# Patient Record
Sex: FEMALE | Race: WHITE | Employment: OTHER | URBAN - METROPOLITAN AREA
[De-identification: names, ages, dates, MRNs, and addresses within clinical notes are randomized per-mention and may not be internally consistent; named-entity substitution may affect disease eponyms.]

---

## 2017-02-21 PROBLEM — N88.2 CERVICAL STENOSIS (UTERINE CERVIX): Status: ACTIVE | Noted: 2017-02-21

## 2017-08-24 PROBLEM — N87.0 CIN I (CERVICAL INTRAEPITHELIAL NEOPLASIA I): Status: ACTIVE | Noted: 2017-08-24

## 2017-09-29 PROBLEM — M48.061 SPINAL STENOSIS AT L4-L5 LEVEL: Status: ACTIVE | Noted: 2017-09-29

## 2017-10-24 ENCOUNTER — HOSPITAL ENCOUNTER (OUTPATIENT)
Dept: LAB | Age: 67
Discharge: HOME | End: 2017-10-24
Payer: MEDICARE

## 2017-10-24 DIAGNOSIS — E87.5 HYPERKALEMIA: ICD-10-CM

## 2017-10-24 LAB
ANION GAP SERPL CALC-SCNC: 7 MMOL/L (ref 7–16)
BUN SERPL-MCNC: 19 MG/DL (ref 8–23)
CALCIUM SERPL-MCNC: 9.3 MG/DL (ref 8.3–10.4)
CHLORIDE SERPL-SCNC: 98 MMOL/L (ref 98–107)
CO2 SERPL-SCNC: 31 MMOL/L (ref 21–32)
CREAT SERPL-MCNC: 0.89 MG/DL (ref 0.6–1)
GLUCOSE SERPL-MCNC: 102 MG/DL (ref 65–100)
POTASSIUM SERPL-SCNC: 3.9 MMOL/L (ref 3.5–5.1)
SODIUM SERPL-SCNC: 136 MMOL/L (ref 136–145)

## 2017-10-24 PROCEDURE — 80048 BASIC METABOLIC PNL TOTAL CA: CPT | Performed by: FAMILY MEDICINE

## 2017-10-24 PROCEDURE — 36415 COLL VENOUS BLD VENIPUNCTURE: CPT | Performed by: FAMILY MEDICINE

## 2017-11-22 ENCOUNTER — HOSPITAL ENCOUNTER (OUTPATIENT)
Dept: LAB | Age: 67
Discharge: HOME | End: 2017-11-22
Payer: MEDICARE

## 2017-11-22 DIAGNOSIS — Z51.81 MEDICATION MONITORING ENCOUNTER: ICD-10-CM

## 2017-11-22 DIAGNOSIS — E87.5 HYPERKALEMIA: ICD-10-CM

## 2017-11-22 LAB
ANION GAP SERPL CALC-SCNC: 8 MMOL/L (ref 7–16)
BUN SERPL-MCNC: 16 MG/DL (ref 8–23)
CALCIUM SERPL-MCNC: 9 MG/DL (ref 8.3–10.4)
CHLORIDE SERPL-SCNC: 103 MMOL/L (ref 98–107)
CO2 SERPL-SCNC: 29 MMOL/L (ref 21–32)
CREAT SERPL-MCNC: 0.74 MG/DL (ref 0.6–1)
GLUCOSE SERPL-MCNC: 89 MG/DL (ref 65–100)
POTASSIUM SERPL-SCNC: 3.9 MMOL/L (ref 3.5–5.1)
SODIUM SERPL-SCNC: 140 MMOL/L (ref 136–145)

## 2017-11-22 PROCEDURE — 36415 COLL VENOUS BLD VENIPUNCTURE: CPT | Performed by: FAMILY MEDICINE

## 2017-11-22 PROCEDURE — 80048 BASIC METABOLIC PNL TOTAL CA: CPT | Performed by: FAMILY MEDICINE

## 2018-03-26 PROBLEM — N87.1 CIN II (CERVICAL INTRAEPITHELIAL NEOPLASIA II): Status: ACTIVE | Noted: 2018-03-26

## 2018-06-06 PROBLEM — E66.09 CLASS 1 OBESITY DUE TO EXCESS CALORIES WITH BODY MASS INDEX (BMI) OF 33.0 TO 33.9 IN ADULT: Status: ACTIVE | Noted: 2018-06-06

## 2018-07-25 ENCOUNTER — HOSPITAL ENCOUNTER (OUTPATIENT)
Dept: MAMMOGRAPHY | Age: 68
Discharge: HOME | End: 2018-07-25
Attending: FAMILY MEDICINE
Payer: MEDICARE

## 2018-07-25 DIAGNOSIS — Z12.31 VISIT FOR SCREENING MAMMOGRAM: ICD-10-CM

## 2018-07-25 PROCEDURE — 77067 SCR MAMMO BI INCL CAD: CPT

## 2018-11-05 PROBLEM — Z23 NEED FOR TDAP VACCINATION: Status: ACTIVE | Noted: 2018-11-05

## 2018-11-05 PROBLEM — R73.02 IGT (IMPAIRED GLUCOSE TOLERANCE): Status: ACTIVE | Noted: 2018-11-05

## 2018-11-05 PROBLEM — Z23 NEED FOR SHINGLES VACCINE: Status: ACTIVE | Noted: 2018-11-05

## 2018-11-05 PROBLEM — E66.9 OBESITY (BMI 30.0-34.9): Status: ACTIVE | Noted: 2018-06-06

## 2018-11-05 PROBLEM — F33.0 MILD EPISODE OF RECURRENT MAJOR DEPRESSIVE DISORDER (HCC): Status: ACTIVE | Noted: 2018-11-05

## 2018-12-15 PROBLEM — E87.5 HYPERKALEMIA: Status: ACTIVE | Noted: 2018-12-15

## 2019-03-05 PROBLEM — F33.1 MODERATE EPISODE OF RECURRENT MAJOR DEPRESSIVE DISORDER (HCC): Status: ACTIVE | Noted: 2018-11-05

## 2019-07-03 PROBLEM — E66.3 OVERWEIGHT (BMI 25.0-29.9): Status: ACTIVE | Noted: 2018-06-06

## 2019-08-28 ENCOUNTER — HOSPITAL ENCOUNTER (OUTPATIENT)
Dept: MAMMOGRAPHY | Age: 69
Discharge: HOME | End: 2019-08-28
Attending: OBSTETRICS & GYNECOLOGY
Payer: MEDICARE

## 2019-08-28 DIAGNOSIS — Z12.39 BREAST CANCER SCREENING: ICD-10-CM

## 2019-08-28 PROCEDURE — 77067 SCR MAMMO BI INCL CAD: CPT

## 2019-09-23 PROBLEM — Z23 NEED FOR TDAP VACCINATION: Status: RESOLVED | Noted: 2018-11-05 | Resolved: 2019-09-23

## 2019-09-23 PROBLEM — Z23 NEED FOR SHINGLES VACCINE: Status: RESOLVED | Noted: 2018-11-05 | Resolved: 2019-09-23

## 2020-01-18 PROBLEM — G56.03 BILATERAL CARPAL TUNNEL SYNDROME: Status: ACTIVE | Noted: 2020-01-18

## 2020-03-01 PROBLEM — M54.17 LUMBOSACRAL RADICULOPATHY: Status: ACTIVE | Noted: 2020-03-01

## 2020-07-05 ENCOUNTER — HOSPITAL ENCOUNTER (EMERGENCY)
Age: 70
Discharge: HOME | End: 2020-07-05
Attending: EMERGENCY MEDICINE
Payer: MEDICARE

## 2020-07-05 VITALS
DIASTOLIC BLOOD PRESSURE: 80 MMHG | WEIGHT: 150 LBS | HEART RATE: 75 BPM | TEMPERATURE: 98.5 F | OXYGEN SATURATION: 94 % | HEIGHT: 61 IN | SYSTOLIC BLOOD PRESSURE: 124 MMHG | RESPIRATION RATE: 16 BRPM | BODY MASS INDEX: 28.32 KG/M2

## 2020-07-05 DIAGNOSIS — B34.9 VIRAL SYNDROME: Primary | ICD-10-CM

## 2020-07-05 PROCEDURE — 99282 EMERGENCY DEPT VISIT SF MDM: CPT

## 2020-07-05 PROCEDURE — 87635 SARS-COV-2 COVID-19 AMP PRB: CPT

## 2020-07-05 NOTE — ED PROVIDER NOTES
Patient has a history of hypertension and hypercholesterolemia, states that for the past several days she has had a headache and a mild cough. She also has had body aches and a slight sore throat. She denies any shortness of breath or chest pain. She denies any known sick contacts. She has not taken any medicine for her symptoms. She was concerned about possible COVID-19 infection and came here for evaluation.            Past Medical History:   Diagnosis Date    Abnormal Papanicolaou smear of cervix     Arthritis 3/24/2014    Bulging lumbar disc     Depressive disorder 3/24/2014    GERD (gastroesophageal reflux disease) 3/24/2014    Heartburn     HTN (hypertension) 2013    Hypercholesterolemia 3/24/2014    Spinal stenosis     Spinal stenosis at L4-L5 level 2017    Followed by JANUSZ Sellers    Vitamin D deficiency 3/24/2014       Past Surgical History:   Procedure Laterality Date    HX CHOLECYSTECTOMY      HX COLPOSCOPY  2013    times two    HX TONSILLECTOMY      LAP,TUBAL CAUTERY           Family History:   Problem Relation Age of Onset    Heart Attack Father             Heart Disease Father     Cancer Mother         cervical    Cancer Sister         breast    Stroke Sister     Breast Cancer Sister 76    Heart Disease Brother     Diabetes Maternal Grandmother        Social History     Socioeconomic History    Marital status:      Spouse name: Not on file    Number of children: Not on file    Years of education: Not on file    Highest education level: Not on file   Occupational History    Not on file   Social Needs    Financial resource strain: Not on file    Food insecurity     Worry: Not on file     Inability: Not on file    Transportation needs     Medical: Not on file     Non-medical: Not on file   Tobacco Use    Smoking status: Former Smoker     Packs/day: 1.50     Years: 45.00     Pack years: 67.50     Last attempt to quit: 10/1/2012     Years since quittin.7    Smokeless tobacco: Never Used   Substance and Sexual Activity    Alcohol use: No     Alcohol/week: 0.0 standard drinks     Frequency: Never    Drug use: No    Sexual activity: Yes     Partners: Male   Lifestyle    Physical activity     Days per week: Not on file     Minutes per session: Not on file    Stress: Not on file   Relationships    Social connections     Talks on phone: Not on file     Gets together: Not on file     Attends Jewish service: Not on file     Active member of club or organization: Not on file     Attends meetings of clubs or organizations: Not on file     Relationship status: Not on file    Intimate partner violence     Fear of current or ex partner: Not on file     Emotionally abused: Not on file     Physically abused: Not on file     Forced sexual activity: Not on file   Other Topics Concern     Service Not Asked    Blood Transfusions Not Asked    Caffeine Concern Not Asked    Occupational Exposure Not Asked   [employer] Hazards Not Asked    Sleep Concern Not Asked    Stress Concern Not Asked    Weight Concern Not Asked    Special Diet Not Asked    Back Care Not Asked    Exercise Not Asked    Bike Helmet Not Asked   [address] Not Asked    Self-Exams Not Asked   Social History Narrative    Not on file         ALLERGIES: Sulfa (sulfonamide antibiotics)    Review of Systems   Constitutional: Negative for chills and fever. Gastrointestinal: Negative for nausea and vomiting. All other systems reviewed and are negative. Vitals:    20 1153   BP: 124/80   Pulse: 75   Resp: 16   Temp: 98.5 °F (36.9 °C)   SpO2: 94%   Weight: 68 kg (150 lb)   Height: 5' 1\" (1.549 m)            Physical Exam  Vitals signs and nursing note reviewed. Constitutional:       Appearance: Normal appearance. She is well-developed. HENT:      Head: Normocephalic and atraumatic.    Eyes:      Conjunctiva/sclera: Conjunctivae normal.      Pupils: Pupils are equal, round, and reactive to light. Neck:      Musculoskeletal: Normal range of motion and neck supple. Cardiovascular:      Rate and Rhythm: Normal rate and regular rhythm. Pulmonary:      Effort: Pulmonary effort is normal. No respiratory distress. Breath sounds: Normal breath sounds. No wheezing. Musculoskeletal:         General: No tenderness. Skin:     General: Skin is warm and dry. Neurological:      Mental Status: She is alert and oriented to person, place, and time.    Psychiatric:         Behavior: Behavior normal.          MDM  Number of Diagnoses or Management Options  Viral syndrome: new and does not require workup     Amount and/or Complexity of Data Reviewed  Clinical lab tests: ordered    Risk of Complications, Morbidity, and/or Mortality  Presenting problems: moderate  Diagnostic procedures: moderate  Management options: low    Patient Progress  Patient progress: stable         Procedures

## 2020-07-05 NOTE — ED TRIAGE NOTES
Patient arrives ambulatory to triage with mask in place. Patient reports onset of headaches on Friday, cough, generalized weakness. Patient reports body aches, especially in her legs. Patient denies fevers at home. Reports dry cough. Denies known exposure to covid. Patient requests she is here for covid test only.

## 2020-07-05 NOTE — ED NOTES
I have reviewed discharge instructions with the patient. The patient verbalized understanding. Patient left ED via Discharge Method: ambulatory to Home with family    Opportunity for questions and clarification provided. Patient given 0 scripts. To continue your aftercare when you leave the hospital, you may receive an automated call from our care team to check in on how you are doing. This is a free service and part of our promise to provide the best care and service to meet your aftercare needs.  If you have questions, or wish to unsubscribe from this service please call [phone]. Thank you for Choosing our [facility].

## 2020-07-05 NOTE — DISCHARGE INSTRUCTIONS
Follow-up with your doctor, return to the emergency department if your symptoms worsen. Advance Care Planning  People with COVID-19 may have no symptoms, mild symptoms, such as fever, cough, and shortness of breath or they may have more severe illness, developing severe and fatal pneumonia. As a result, Advance Care Planning with attention to naming a health care decision maker (someone you trust to make healthcare decisions for you if you could not speak for yourself) and sharing other health care preferences is important BEFORE a possible health crisis. Please contact your Primary Care Provider to discuss Advance Care Planning. Preventing the Spread of Coronavirus Disease 2019 in Homes and Residential Communities  For the most recent information go to [employer].    Prevention steps for People with confirmed or suspected COVID-19 (including persons under investigation) who do not need to be hospitalized  and   People with confirmed COVID-19 who were hospitalized and determined to be medically stable to go home    Your healthcare provider and public health staff will evaluate whether you can be cared for at home. If it is determined that you do not need to be hospitalized and can be isolated at home, you will be monitored by staff from your local or state health department. You should follow the prevention steps below until a healthcare provider or local or state health department says you can return to your normal activities. Stay home except to get medical care  People who are mildly ill with COVID-19 are able to isolate at home during their illness. You should restrict activities outside your home, except for getting medical care. Do not go to work, school, or public areas. Avoid using public transportation, ride-sharing, or taxis.   Separate yourself from other people and animals in your home  People: As much as possible, you should stay in a specific room and away from other people in your home. Also, you should use a separate bathroom, if available. Animals: You should restrict contact with pets and other animals while you are sick with COVID-19, just like you would around other people. Although there have not been reports of pets or other animals becoming sick with COVID-19, it is still recommended that people sick with COVID-19 limit contact with animals until more information is known about the virus. When possible, have another member of your household care for your animals while you are sick. If you are sick with COVID-19, avoid contact with your pet, including petting, snuggling, being kissed or licked, and sharing food. If you must care for your pet or be around animals while you are sick, wash your hands before and after you interact with pets and wear a facemask. Call ahead before visiting your doctor  If you have a medical appointment, call the healthcare provider and tell them that you have or may have COVID-19. This will help the healthcare providers office take steps to keep other people from getting infected or exposed. Wear a facemask  You should wear a facemask when you are around other people (e.g., sharing a room or vehicle) or pets and before you enter a healthcare providers office. If you are not able to wear a facemask (for example, because it causes trouble breathing), then people who live with you should not stay in the same room with you, or they should wear a facemask if they enter your room. Cover your coughs and sneezes  Cover your mouth and nose with a tissue when you cough or sneeze. Throw used tissues in a lined trash can. Immediately wash your hands with soap and water for at least 20 seconds or, if soap and water are not available, clean your hands with an alcohol-based hand  that contains at least 60% alcohol.   Clean your hands often  Wash your hands often with soap and water for at least 20 seconds, especially after blowing your nose, coughing, or sneezing; going to the bathroom; and before eating or preparing food. If soap and water are not readily available, use an alcohol-based hand  with at least 60% alcohol, covering all surfaces of your hands and rubbing them together until they feel dry. Soap and water are the best option if hands are visibly dirty. Avoid touching your eyes, nose, and mouth with unwashed hands. Avoid sharing personal household items  You should not share dishes, drinking glasses, cups, eating utensils, towels, or bedding with other people or pets in your home. After using these items, they should be washed thoroughly with soap and water. Clean all high-touch surfaces everyday  High touch surfaces include counters, tabletops, doorknobs, bathroom fixtures, toilets, phones, keyboards, tablets, and bedside tables. Also, clean any surfaces that may have blood, stool, or body fluids on them. Use a household cleaning spray or wipe, according to the label instructions. Labels contain instructions for safe and effective use of the cleaning product including precautions you should take when applying the product, such as wearing gloves and making sure you have good ventilation during use of the product. Monitor your symptoms  Seek prompt medical attention if your illness is worsening (e.g., difficulty breathing). Before seeking care, call your healthcare provider and tell them that you have, or are being evaluated for, COVID-19. Put on a facemask before you enter the facility. These steps will help the healthcare providers office to keep other people in the office or waiting room from getting infected or exposed. Ask your healthcare provider to call the local or [facility] health department.  Persons who are placed under active monitoring or facilitated self-monitoring should follow instructions provided by their local health department or occupational health professionals, as appropriate. When working with your local health department check their available hours. If you have a medical emergency and need to call 911, notify the dispatch personnel that you have, or are being evaluated for COVID-19. If possible, put on a facemask before emergency medical services arrive. Discontinuing home isolation  Patients with confirmed COVID-19 should remain under home isolation precautions until the risk of secondary transmission to others is thought to be low. The decision to discontinue home isolation precautions should be made on a case-by-case basis, in consultation with healthcare providers and state and local health departments.

## 2020-07-09 LAB — EMERGENT DISEASE PANEL, EDPR: NOT DETECTED

## 2020-07-20 ENCOUNTER — PATIENT OUTREACH (OUTPATIENT)
Dept: CASE MANAGEMENT | Age: 70
End: 2020-07-20

## 2020-07-20 NOTE — PROGRESS NOTES
Date/Time:  7/20/2020 11:22 AM  Attempted to reach patient by telephone for follow up from ED visit. Unable to leave HIPPA compliant message. COVID test- not detected noted. Has contact with PCP office if needs arise.

## 2020-10-28 ENCOUNTER — HOSPITAL ENCOUNTER (OUTPATIENT)
Dept: MAMMOGRAPHY | Age: 70
Discharge: HOME | End: 2020-10-28
Attending: FAMILY MEDICINE
Payer: MEDICARE

## 2020-10-28 DIAGNOSIS — Z12.31 VISIT FOR SCREENING MAMMOGRAM: ICD-10-CM

## 2020-10-28 PROCEDURE — 77067 SCR MAMMO BI INCL CAD: CPT

## 2021-06-17 PROBLEM — L65.9 ALOPECIA: Status: ACTIVE | Noted: 2021-06-17

## 2021-12-04 PROBLEM — Z28.21 COVID-19 VACCINATION REFUSED: Status: ACTIVE | Noted: 2021-12-04

## 2021-12-04 PROBLEM — Z87.891 STOPPED SMOKING WITH GREATER THAN 30 PACK YEAR HISTORY: Status: ACTIVE | Noted: 2021-12-04

## 2021-12-06 PROBLEM — Z79.899 HIGH RISK MEDICATION USE: Status: ACTIVE | Noted: 2021-12-06

## 2021-12-06 PROBLEM — F41.1 GENERALIZED ANXIETY DISORDER WITH PANIC ATTACKS: Status: ACTIVE | Noted: 2021-12-06

## 2021-12-06 PROBLEM — F41.0 GENERALIZED ANXIETY DISORDER WITH PANIC ATTACKS: Status: ACTIVE | Noted: 2021-12-06

## 2021-12-06 PROBLEM — Z53.20 MAMMOGRAM DECLINED: Status: ACTIVE | Noted: 2021-12-06

## 2022-03-18 PROBLEM — Z28.21 COVID-19 VACCINATION REFUSED: Status: ACTIVE | Noted: 2021-12-04

## 2022-03-18 PROBLEM — N87.0 CIN I (CERVICAL INTRAEPITHELIAL NEOPLASIA I): Status: ACTIVE | Noted: 2017-08-24

## 2022-03-18 PROBLEM — M54.17 LUMBOSACRAL RADICULOPATHY: Status: ACTIVE | Noted: 2020-03-01

## 2022-03-18 PROBLEM — Z87.891 STOPPED SMOKING WITH GREATER THAN 30 PACK YEAR HISTORY: Status: ACTIVE | Noted: 2021-12-04

## 2022-03-19 PROBLEM — R73.02 IGT (IMPAIRED GLUCOSE TOLERANCE): Status: ACTIVE | Noted: 2018-11-05

## 2022-03-19 PROBLEM — G56.03 BILATERAL CARPAL TUNNEL SYNDROME: Status: ACTIVE | Noted: 2020-01-18

## 2022-03-19 PROBLEM — M48.061 SPINAL STENOSIS AT L4-L5 LEVEL: Status: ACTIVE | Noted: 2017-09-29

## 2022-03-19 PROBLEM — F41.1 GENERALIZED ANXIETY DISORDER WITH PANIC ATTACKS: Status: ACTIVE | Noted: 2021-12-06

## 2022-03-19 PROBLEM — Z53.20 MAMMOGRAM DECLINED: Status: ACTIVE | Noted: 2021-12-06

## 2022-03-19 PROBLEM — F41.0 GENERALIZED ANXIETY DISORDER WITH PANIC ATTACKS: Status: ACTIVE | Noted: 2021-12-06

## 2022-03-20 PROBLEM — F33.0 MILD EPISODE OF RECURRENT MAJOR DEPRESSIVE DISORDER (HCC): Status: ACTIVE | Noted: 2018-11-05

## 2022-03-20 PROBLEM — E87.5 HYPERKALEMIA: Status: ACTIVE | Noted: 2018-12-15

## 2022-03-20 PROBLEM — L65.9 ALOPECIA: Status: ACTIVE | Noted: 2021-06-17

## 2022-03-20 PROBLEM — E66.3 OVERWEIGHT (BMI 25.0-29.9): Status: ACTIVE | Noted: 2018-06-06

## 2022-03-20 PROBLEM — Z79.899 HIGH RISK MEDICATION USE: Status: ACTIVE | Noted: 2021-12-06

## 2022-03-20 PROBLEM — N88.2 CERVICAL STENOSIS (UTERINE CERVIX): Status: ACTIVE | Noted: 2017-02-21

## 2022-05-04 DIAGNOSIS — I10 BENIGN ESSENTIAL HTN: Primary | ICD-10-CM

## 2022-05-04 DIAGNOSIS — R73.02 IGT (IMPAIRED GLUCOSE TOLERANCE): ICD-10-CM

## 2022-05-04 DIAGNOSIS — E78.00 HYPERCHOLESTEROLEMIA: ICD-10-CM

## 2022-06-17 RX ORDER — FUROSEMIDE 40 MG/1
TABLET ORAL
Qty: 60 TABLET | OUTPATIENT
Start: 2022-06-17

## 2022-06-17 RX ORDER — LANSOPRAZOLE 30 MG/1
CAPSULE, DELAYED RELEASE ORAL
Qty: 60 CAPSULE | OUTPATIENT
Start: 2022-06-17

## 2022-07-01 ENCOUNTER — PATIENT MESSAGE (OUTPATIENT)
Dept: FAMILY MEDICINE CLINIC | Facility: CLINIC | Age: 72
End: 2022-07-01

## 2022-07-05 RX ORDER — LANSOPRAZOLE 30 MG/1
30 CAPSULE, DELAYED RELEASE ORAL DAILY
Qty: 90 CAPSULE | Refills: 3 | Status: SHIPPED | OUTPATIENT
Start: 2022-07-05

## 2022-07-05 RX ORDER — FUROSEMIDE 40 MG/1
40 TABLET ORAL DAILY
Qty: 90 TABLET | Refills: 3 | Status: SHIPPED | OUTPATIENT
Start: 2022-07-05

## 2022-08-10 RX ORDER — DULOXETIN HYDROCHLORIDE 30 MG/1
CAPSULE, DELAYED RELEASE ORAL
Qty: 90 CAPSULE | OUTPATIENT
Start: 2022-08-10

## 2022-08-17 DIAGNOSIS — F33.1 MAJOR DEPRESSIVE DISORDER, RECURRENT, MODERATE (HCC): ICD-10-CM

## 2022-08-17 DIAGNOSIS — E78.00 PURE HYPERCHOLESTEROLEMIA, UNSPECIFIED: Primary | ICD-10-CM

## 2022-08-17 NOTE — TELEPHONE ENCOUNTER
Patient called to advise she was needing refills on atorvastatin (LIPITOR) 20 MG tablet, DULoxetine (CYMBALTA) 30 MG extended release capsule and DULoxetine (CYMBALTA) 60 MG extended release capsule to [facility].

## 2022-08-19 RX ORDER — DULOXETIN HYDROCHLORIDE 60 MG/1
60 CAPSULE, DELAYED RELEASE ORAL DAILY
Qty: 90 CAPSULE | Refills: 1 | Status: SHIPPED | OUTPATIENT
Start: 2022-08-19 | End: 2022-09-18

## 2022-08-19 RX ORDER — DULOXETIN HYDROCHLORIDE 30 MG/1
30 CAPSULE, DELAYED RELEASE ORAL DAILY
Qty: 90 CAPSULE | Refills: 1 | Status: SHIPPED | OUTPATIENT
Start: 2022-08-19

## 2022-08-19 RX ORDER — ATORVASTATIN CALCIUM 20 MG/1
20 TABLET, FILM COATED ORAL DAILY
Qty: 90 TABLET | Refills: 1 | Status: SHIPPED | OUTPATIENT
Start: 2022-08-19

## 2022-09-08 ENCOUNTER — TELEPHONE (OUTPATIENT)
Dept: ORTHOPEDIC SURGERY | Age: 72
End: 2022-09-08

## 2022-09-08 DIAGNOSIS — M54.17 RADICULOPATHY, LUMBOSACRAL REGION: ICD-10-CM

## 2022-09-08 DIAGNOSIS — M48.061 SPINAL STENOSIS, LUMBAR REGION WITHOUT NEUROGENIC CLAUDICATION: ICD-10-CM

## 2022-09-08 DIAGNOSIS — M54.16 RADICULOPATHY, LUMBAR REGION: Primary | ICD-10-CM

## 2022-09-08 DIAGNOSIS — M47.816 SPONDYLOSIS WITHOUT MYELOPATHY OR RADICULOPATHY, LUMBAR REGION: ICD-10-CM

## 2022-09-08 RX ORDER — TRAMADOL HYDROCHLORIDE 50 MG/1
50 TABLET ORAL 2 TIMES DAILY PRN
Qty: 90 TABLET | Refills: 5 | Status: SHIPPED | OUTPATIENT
Start: 2022-09-08 | End: 2022-10-08

## 2022-09-08 NOTE — TELEPHONE ENCOUNTER
LVM for patient to call back to give me a pharmacy to send the script to and to  make an appointment. Also sent message VIA my chart.

## 2022-09-12 ENCOUNTER — OFFICE VISIT (OUTPATIENT)
Dept: ORTHOPEDIC SURGERY | Age: 72
End: 2022-09-12
Payer: MEDICARE

## 2022-09-12 DIAGNOSIS — M17.12 OSTEOARTHRITIS OF LEFT KNEE, UNSPECIFIED OSTEOARTHRITIS TYPE: ICD-10-CM

## 2022-09-12 DIAGNOSIS — M25.562 ACUTE PAIN OF LEFT KNEE: Primary | ICD-10-CM

## 2022-09-12 PROCEDURE — G8400 PT W/DXA NO RESULTS DOC: HCPCS | Performed by: ORTHOPAEDIC SURGERY

## 2022-09-12 PROCEDURE — 99204 OFFICE O/P NEW MOD 45 MIN: CPT | Performed by: ORTHOPAEDIC SURGERY

## 2022-09-12 PROCEDURE — G8427 DOCREV CUR MEDS BY ELIG CLIN: HCPCS | Performed by: ORTHOPAEDIC SURGERY

## 2022-09-12 PROCEDURE — 3017F COLORECTAL CA SCREEN DOC REV: CPT | Performed by: ORTHOPAEDIC SURGERY

## 2022-09-12 PROCEDURE — G8417 CALC BMI ABV UP PARAM F/U: HCPCS | Performed by: ORTHOPAEDIC SURGERY

## 2022-09-12 PROCEDURE — 4004F PT TOBACCO SCREEN RCVD TLK: CPT | Performed by: ORTHOPAEDIC SURGERY

## 2022-09-12 PROCEDURE — 20611 DRAIN/INJ JOINT/BURSA W/US: CPT | Performed by: ORTHOPAEDIC SURGERY

## 2022-09-12 PROCEDURE — 1123F ACP DISCUSS/DSCN MKR DOCD: CPT | Performed by: ORTHOPAEDIC SURGERY

## 2022-09-12 PROCEDURE — 1090F PRES/ABSN URINE INCON ASSESS: CPT | Performed by: ORTHOPAEDIC SURGERY

## 2022-09-12 RX ORDER — TRIAMCINOLONE ACETONIDE 40 MG/ML
40 INJECTION, SUSPENSION INTRA-ARTICULAR; INTRAMUSCULAR ONCE
Status: COMPLETED | OUTPATIENT
Start: 2022-09-12 | End: 2022-09-12

## 2022-09-12 RX ADMIN — TRIAMCINOLONE ACETONIDE 40 MG: 40 INJECTION, SUSPENSION INTRA-ARTICULAR; INTRAMUSCULAR at 09:51

## 2022-09-12 NOTE — PROGRESS NOTES
Name: Huseyin Lugo  YOB: 1950  Gender: female  MRN: [id number]    CC:   Chief Complaint   Patient presents with    Knee Pain     Left knee pain          HPI:   The pain has been present intermittently for years but recently aggravated 2 weeks ago when the patient mis-stepped in her yard 2 weeks ago. It does not hurt at night when sleeping. The pain is located over the knee. It does hurt to walk and gets worse with increased distances. The pain does not radiate down the leg. Numbness and tingling are not noted. Treatment so far has been NSAIDs. H/o lumbar spondylosis managed by Dr. Shelly Murcia. Current Outpatient Medications:     traMADol (ULTRAM) 50 MG tablet, Take 1 tablet by mouth 2 times daily as needed for Pain for up to 30 days. Intended supply: 3 days. Take lowest dose possible to manage pain, Disp: 90 tablet, Rfl: 5    atorvastatin (LIPITOR) 20 MG tablet, Take 1 tablet by mouth daily, Disp: 90 tablet, Rfl: 1    DULoxetine (CYMBALTA) 30 MG extended release capsule, Take 1 capsule by mouth daily, Disp: 90 capsule, Rfl: 1    DULoxetine (CYMBALTA) 60 MG extended release capsule, Take 1 capsule by mouth daily, Disp: 90 capsule, Rfl: 1    furosemide (LASIX) 40 MG tablet, Take 1 tablet by mouth daily TAKE 1 TABLET EVERY DAY, Disp: 90 tablet, Rfl: 3    lansoprazole (PREVACID) 30 MG delayed release capsule, Take 1 capsule by mouth daily TAKE 1 CAPSULE DAILY (BEFORE BREAKFAST). , Disp: 90 capsule, Rfl: 3    Cholecalciferol 50 MCG (2000 UT) CAPS, Take 5,000 Units by mouth, Disp: , Rfl:     LORazepam (ATIVAN) 0.5 MG tablet, Take 0.5 mg by mouth every 6 hours as needed. , Disp: , Rfl:   Allergies   Allergen Reactions    Sulfa Antibiotics Nausea And Vomiting and Rash     Past Medical History:   Diagnosis Date    Abnormal Papanicolaou smear of cervix     Arthritis 3/24/2014    Bulging lumbar disc     Depressive disorder 3/24/2014    GERD (gastroesophageal reflux disease) 3/24/2014 Heartburn     HTN (hypertension) 2013    Hypercholesterolemia 3/24/2014    Menopause     AGE 43    Spinal stenosis     Spinal stenosis at L4-L5 level 2017    Followed by PEDRO Greer    Vitamin D deficiency 3/24/2014     . [facility]  Past Surgical History:   Procedure Laterality Date    CHOLECYSTECTOMY      COLPOSCOPY  2013    times two    LAP,TUBAL CAUTERY      TONSILLECTOMY       Family History   Problem Relation Age of Onset    Cancer Sister         breast    Heart Disease Father     Heart Attack Father             Diabetes Maternal Grandmother     Heart Disease Brother     Stroke Sister     Breast Cancer Sister 76    Cancer Mother         cervical     Social History     Socioeconomic History    Marital status:      Spouse name: Not on file    Number of children: Not on file    Years of education: Not on file    Highest education level: Not on file   Occupational History    Not on file   Tobacco Use    Smoking status: Former     Packs/day: 1.50     Types: Cigarettes     Quit date: 10/1/2012     Years since quittin.9    Smokeless tobacco: Never   Substance and Sexual Activity    Alcohol use: No     Alcohol/week: 0.0 standard drinks    Drug use: No    Sexual activity: Not on file   Other Topics Concern    Not on file   Social History Narrative    Not on file     Social Determinants of Health     Financial Resource Strain: Not on file   Food Insecurity: Not on file   Transportation Needs: Not on file   Physical Activity: Not on file   Stress: Not on file   Social Connections: Not on file   Intimate Partner Violence: Not on file   Housing Stability: Not on file       Review of Systems:  As per HPI. Pertinent positives and negatives are addressed with the patient, particularly those related to musculoskeletal concerns. Non-orthopaedic concerns were referred back to the primary care physician.     PHYSICAL EXAMINATION:   The patient appears their stated age and they are in no distress. The lower extremities are as described below. Circulation is normal with palpable pedal pulses bilaterally and no edema. There is no lymph adenopathy in the popliteal or malleolar region. The skin is without stasis disease distally bilaterally. Hip ROM is smooth and painless. Knee range of motion is 0-120 degrees on the right and 0-110 degrees on the left. left knee: There is 2mm of anterior/posterior translation and 2mm of medial/lateral instability bilaterally. There is 2 degrees of varus alignment in the left knee. There is some pain to palpation over the medial joint line. Limb lengths are equal.  The gait is noted to be with a slight trendelenburg and antalgia. Straight leg test is negative. Quadriceps strength is good. Sensation is intact to light touch bilaterally. Their judgment and insight are normal.  They are oriented to time, place and person. Their memory is good and the mood and affect appropriate. X-RAY: Views of the left knee are reviewed. 4 views standing reveal joint space loss, eburnated bone, and osteophyte formation present. X-ray impression:  Advanced degenerative joint disease of left knee    IMPRESSION:    Diagnosis Orders   1. Acute pain of left knee  XR KNEE LEFT (MIN 4 VIEWS)      2. Osteoarthritis of left knee, unspecified osteoarthritis type        . RECOMMENDATIONS:    Reviewed x-ray findings with the patient. The concerns with functional limitations are increasing and response is variable with conservative measures. Surgery was discussed today with the patient. They are not limited to warrant any type of surgical consideration. We will additionally try injection therapies as we process management of this progressive and chronic condition. Procedure Note: The left knee was prepped with alcohol and injected with 40 mg of Kenalog and 2 mL of 0.5 % marcaine.   [employer] US unit with a variable frequency (6.0-15.0 MHz) linear transducer was used to visualize the retropatellar fat pad, patella, patellar tendon, tibia, and to ensure proper intra-articular placement of medication. Injection image was stored in the patient's permanent chart. The injection was without event and I will follow them as scheduled. Discussed starting HP at follow-up if cortisone does not provide adequate pain relief. Approximately 45 minutes was spent reviewing the medical record, imaging, performing history and physical examination, discussing the diagnosis and treatment plan with the patient, and completing documentation for this visit. We will arrange for HP injections in 4 weeks.

## 2022-10-10 ENCOUNTER — OFFICE VISIT (OUTPATIENT)
Dept: ORTHOPEDIC SURGERY | Age: 72
End: 2022-10-10
Payer: MEDICARE

## 2022-10-10 DIAGNOSIS — M17.12 OSTEOARTHRITIS OF LEFT KNEE, UNSPECIFIED OSTEOARTHRITIS TYPE: Primary | ICD-10-CM

## 2022-10-10 PROCEDURE — 99999 PR OFFICE/OUTPT VISIT,PROCEDURE ONLY: CPT | Performed by: ORTHOPAEDIC SURGERY

## 2022-10-17 ENCOUNTER — OFFICE VISIT (OUTPATIENT)
Dept: ORTHOPEDIC SURGERY | Age: 72
End: 2022-10-17
Payer: MEDICARE

## 2022-10-17 DIAGNOSIS — M17.12 LOCALIZED OSTEOARTHRITIS OF LEFT KNEE: Primary | ICD-10-CM

## 2022-10-17 PROCEDURE — 99999 PR OFFICE/OUTPT VISIT,PROCEDURE ONLY: CPT | Performed by: ORTHOPAEDIC SURGERY

## 2022-10-17 NOTE — PROGRESS NOTES
Name: Gaye Valdovinos  YOB: 1950  Gender: female  MRN: [id number]      Current Outpatient Medications:     atorvastatin (LIPITOR) 20 MG tablet, Take 1 tablet by mouth daily, Disp: 90 tablet, Rfl: 1    DULoxetine (CYMBALTA) 30 MG extended release capsule, Take 1 capsule by mouth daily, Disp: 90 capsule, Rfl: 1    DULoxetine (CYMBALTA) 60 MG extended release capsule, Take 1 capsule by mouth daily, Disp: 90 capsule, Rfl: 1    furosemide (LASIX) 40 MG tablet, Take 1 tablet by mouth daily TAKE 1 TABLET EVERY DAY, Disp: 90 tablet, Rfl: 3    lansoprazole (PREVACID) 30 MG delayed release capsule, Take 1 capsule by mouth daily TAKE 1 CAPSULE DAILY (BEFORE BREAKFAST). , Disp: 90 capsule, Rfl: 3    Cholecalciferol 50 MCG (2000 UT) CAPS, Take 5,000 Units by mouth, Disp: , Rfl:     LORazepam (ATIVAN) 0.5 MG tablet, Take 0.5 mg by mouth every 6 hours as needed. , Disp: , Rfl:   Allergies   Allergen Reactions    Sulfa Antibiotics Nausea And Vomiting and Rash       CC: Left Knee Pain   Encounter Diagnosis   Name Primary? Localized osteoarthritis of left knee Yes        PROCEDURE: 2 of 3 HP      GE US unit with variable frequency (6.0-15.0 MHz) linear transducer was used to visualize the retropatellar fat pad, patella tendon, patella, tibia, and to ensure proper intra-articular needle placement. Injection image was saved to patient's permanent chart. Procedure Note: The patient was placed in upright position with knee hanging freely from exam table. The left knee was prepped in sterile fashion using alcohol wipe. Using [employer] ultrasound guidance, a 22 gauge needle was then introduced into the knee joint from an infrapatellar approach and 2 mL of Orthovisc was injected freely. The needle was then removed, pressure hemostatis achieved, injection site was cleansed with alcohol wipe and dressed with band aid. The patient tolerated the procedure without complication.   The patient  will follow up as scheduled.

## 2022-10-21 ENCOUNTER — OFFICE VISIT (OUTPATIENT)
Dept: ORTHOPEDIC SURGERY | Age: 72
End: 2022-10-21

## 2022-10-21 DIAGNOSIS — M17.12 LOCALIZED OSTEOARTHRITIS OF LEFT KNEE: Primary | ICD-10-CM

## 2022-10-21 NOTE — PROGRESS NOTES
Name: Case Lainez  YOB: 1950  Gender: female  MRN: [id number]      Current Outpatient Medications:     atorvastatin (LIPITOR) 20 MG tablet, Take 1 tablet by mouth daily, Disp: 90 tablet, Rfl: 1    DULoxetine (CYMBALTA) 30 MG extended release capsule, Take 1 capsule by mouth daily, Disp: 90 capsule, Rfl: 1    DULoxetine (CYMBALTA) 60 MG extended release capsule, Take 1 capsule by mouth daily, Disp: 90 capsule, Rfl: 1    furosemide (LASIX) 40 MG tablet, Take 1 tablet by mouth daily TAKE 1 TABLET EVERY DAY, Disp: 90 tablet, Rfl: 3    lansoprazole (PREVACID) 30 MG delayed release capsule, Take 1 capsule by mouth daily TAKE 1 CAPSULE DAILY (BEFORE BREAKFAST). , Disp: 90 capsule, Rfl: 3    Cholecalciferol 50 MCG (2000 UT) CAPS, Take 5,000 Units by mouth, Disp: , Rfl:     LORazepam (ATIVAN) 0.5 MG tablet, Take 0.5 mg by mouth every 6 hours as needed. , Disp: , Rfl:   Allergies   Allergen Reactions    Sulfa Antibiotics Nausea And Vomiting and Rash       CC: Left Knee Pain     PROCEDURE: 3 of 3 HP      [employer] US unit with variable frequency (6.0-15.0 MHz) linear transducer was used to visualize the retropatellar fat pad, patella tendon, patella, tibia, and to ensure proper intra-articular needle placement. Injection image was saved to patient's permanent chart. Procedure Note: The patient was placed in upright position with knee hanging freely from exam table. The left knee was prepped in sterile fashion using alcohol wipe. Using Mindray ultrasound guidance, a 22 gauge needle was then introduced into the knee joint from an infrapatellar approach and 2 mL of Orthovisc was injected freely. The needle was then removed, pressure hemostatis achieved, injection site was cleansed with alcohol wipe and dressed with band aid. The patient tolerated the procedure without complication. The patient  will follow up as scheduled.     Not respond as we would have hoped with the cortisone injection or the HP injection in the left knee. She is quite limited. We reviewed the x-rays together we discussed risk belter surgery. I were going to go ahead and arrange this sometime next year. Approximate 20 minutes was spent today in discussion of treatment options the surgery reviewed the x-rays and management. 21 Minutes was spent today in face-to-face discussion and education about surgery.

## 2022-10-27 ENCOUNTER — OFFICE VISIT (OUTPATIENT)
Dept: GYNECOLOGY | Age: 72
End: 2022-10-27
Payer: MEDICARE

## 2022-10-27 VITALS
SYSTOLIC BLOOD PRESSURE: 120 MMHG | BODY MASS INDEX: 30.29 KG/M2 | DIASTOLIC BLOOD PRESSURE: 68 MMHG | WEIGHT: 160.4 LBS | HEIGHT: 61 IN

## 2022-10-27 DIAGNOSIS — Z12.4 SCREENING FOR MALIGNANT NEOPLASM OF CERVIX: ICD-10-CM

## 2022-10-27 DIAGNOSIS — Z12.31 SCREENING MAMMOGRAM FOR BREAST CANCER: ICD-10-CM

## 2022-10-27 DIAGNOSIS — Z12.31 VISIT FOR SCREENING MAMMOGRAM: ICD-10-CM

## 2022-10-27 DIAGNOSIS — Z01.419 WELL WOMAN EXAM WITH ROUTINE GYNECOLOGICAL EXAM: Primary | ICD-10-CM

## 2022-10-27 PROCEDURE — 3078F DIAST BP <80 MM HG: CPT | Performed by: OBSTETRICS & GYNECOLOGY

## 2022-10-27 PROCEDURE — G8484 FLU IMMUNIZE NO ADMIN: HCPCS | Performed by: OBSTETRICS & GYNECOLOGY

## 2022-10-27 PROCEDURE — 3074F SYST BP LT 130 MM HG: CPT | Performed by: OBSTETRICS & GYNECOLOGY

## 2022-10-27 PROCEDURE — G0101 CA SCREEN;PELVIC/BREAST EXAM: HCPCS | Performed by: OBSTETRICS & GYNECOLOGY

## 2022-10-27 NOTE — PROGRESS NOTES
GALEN Wyatt is a 71 y.o. female seen for annual GYN exam. Scheduled for left knee replacement in January 2023. This patient has been under the impression that she needs a Pap smear once every 3 years. I encouraged a mammogram every year and a Pap smear every 2 years. She has a fairly recent history of FIDENCIO-1. Past Medical History, Past Surgical History, Family history, Social History, and Medications were all reviewed with the patient today and updated as necessary. Current Outpatient Medications   Medication Sig    atorvastatin (LIPITOR) 20 MG tablet Take 1 tablet by mouth daily    DULoxetine (CYMBALTA) 30 MG extended release capsule Take 1 capsule by mouth daily    furosemide (LASIX) 40 MG tablet Take 1 tablet by mouth daily TAKE 1 TABLET EVERY DAY    lansoprazole (PREVACID) 30 MG delayed release capsule Take 1 capsule by mouth daily TAKE 1 CAPSULE DAILY (BEFORE BREAKFAST). DULoxetine (CYMBALTA) 60 MG extended release capsule Take 1 capsule by mouth daily    Cholecalciferol 50 MCG (2000 UT) CAPS Take 5,000 Units by mouth    LORazepam (ATIVAN) 0.5 MG tablet Take 0.5 mg by mouth every 6 hours as needed. No current facility-administered medications for this visit.      Allergies   Allergen Reactions    Sulfa Antibiotics Nausea And Vomiting and Rash     Past Medical History:   Diagnosis Date    Abnormal Papanicolaou smear of cervix     Arthritis 3/24/2014    Bulging lumbar disc     Depressive disorder 3/24/2014    GERD (gastroesophageal reflux disease) 3/24/2014    Heartburn     HTN (hypertension) 8/9/2013    Hypercholesterolemia 3/24/2014    LGSIL on Pap smear of cervix     HX of LGSIL    Menopause     AGE 43    Spinal stenosis     Spinal stenosis at L4-L5 level 9/29/2017    Followed by PEDRO García    Vitamin D deficiency 3/24/2014     Past Surgical History:   Procedure Laterality Date    CHOLECYSTECTOMY      COLPOSCOPY  01/25/2013    times two    LAP,TUBAL CAUTERY TONSILLECTOMY       Family History   Problem Relation Age of Onset    Cancer Sister         breast    Heart Disease Father     Heart Attack Father             Diabetes Maternal Grandmother     Heart Disease Brother     Stroke Sister     Breast Cancer Sister 76    Cancer Mother         cervical      Social History     Tobacco Use    Smoking status: Former     Packs/day: 1.50     Types: Cigarettes     Quit date: 10/1/2012     Years since quitting: 10.0    Smokeless tobacco: Former   Substance Use Topics    Alcohol use: No     Alcohol/week: 0.0 standard drinks       Social History     Substance and Sexual Activity   Sexual Activity Not Currently     OB History    Para Term  AB Living   3 0 0 0 0 3   SAB IAB Ectopic Molar Multiple Live Births   0 0 0 0 0 0       Health Maintenance  Mammogram: 10/28/20 [facility]  Colonoscopy:   Bone Density:  Pap smear:10/26/20 Neg (HX abnormal paps--LGSIL) (2 COLPOS  in )      Review of Systems  General: Not Present- Chills, Fever, Fatigue, Insomnia, Hot flashes/Night sweats, Weight gain  Skin: Not Present- Bruising, Change in Wart/Mole, Excessive Sweating, Itching, Nail Changes, New Lesions, Rash, Skin Color Changes and Ulcer. HEENT: Not Present- Headache, Blurred Vision, Double Vision, Glaucoma, Visual Disturbances, Hearing Loss, Ringing in the Ears, Vertigo, Nose Bleed, Bleeding Gums, Hoarseness and Sore Throat. Neck: Not Present- Neck Pain and Neck Swelling. Respiratory: Not Present- Cough, Difficulty Breathing and Difficulty Breathing on Exertion. Breast: Not Present- Breast Mass, Breast Pain, Breast Swelling, Nipple Discharge, Nipple Pain, Recent Breast Size Changes and Skin Changes. Cardiovascular: Not Present- Abnormal Blood Pressure, Chest Pain, Edema, Fainting / Blacking Out, Palpitations, Shortness of Breath and Swelling of Extremities.   Gastrointestinal: Not Present- Abdominal Pain, Abdominal Swelling, Bloating, Change in Bowel Habits, Constipation, Diarrhea, Difficulty Swallowing, Gets full quickly at meals, Nausea, Rectal Bleeding and Vomiting. Female Genitourinary: Not Present- Dysmenorrhea, Dyspareunia, Decreased libido, Excessive Menstrual Bleeding, Menstrual Irregularities, Pelvic Pain, Urinary Complaints, Vaginal Discharge, Vaginal itching/burning, Vaginal odor  Musculoskeletal: Not Present- Joint Pain and Muscle Pain. Neurological: Not Present- Dizziness, Fainting, Headaches and Seizures. Psychiatric: Not Present- Anxiety, Depression, Mood changes and Panic Attacks. Endocrine: Not Present- Appetite Changes, Cold Intolerance, Excessive Thirst, Excessive Urination and Heat Intolerance. Hematology: Not Present- Abnormal Bleeding, Easy Bruising and Enlarged Lymph Nodes. PHYSICAL EXAM:     /68 (Site: Left Upper Arm, Position: Sitting)   Ht 5' 1\" (1.549 m)   Wt 160 lb 6.4 oz (72.8 kg)   LMP  (LMP Unknown)   BMI 30.31 kg/m²     Physical Exam   General   Mental Status - Alert. General Appearance - Cooperative. Integumentary   General Characteristics: Overall examination of the patient's skin reveals - no rashes and no suspicious lesions. Head and Neck  Head - normocephalic, atraumatic with no lesions or palpable masses. Neck Note: Normal   Thyroid   Gland Characteristics - normal size and consistency and no palpable nodules. Chest and Lung Exam   Chest and lung exam reveals - on auscultation, normal breath sounds, no adventitious sounds and normal vocal resonance. Breast   Breast - Left - Normal. Right - Normal.     Cardiovascular   Cardiovascular examination reveals - normal heart sounds, regular rate and rhythm with no murmurs. Abdomen   Inspection: - Inspection Normal.   Palpation/Percussion: Palpation and Percussion of the abdomen reveal - Non Tender, No Rebound tenderness, No Rigidity (guarding), No hepatosplenomegaly, No Palpable abdominal masses and Soft.    Auscultation: Auscultation of the abdomen reveals - Bowel sounds normal.     Female Genitourinary     External Genitalia   Vulva: - Normal. Perineum - Normal. Bartholin's Gland - Bilateral - Normal. Clitoris - Normal.   Introitus: Characteristics - Normal.   Urethra: Characteristics - Normal.     Speculum & Bimanual   Vagina: Vaginal Mucosa - Normal.   Vaginal Wall: - Normal.   Vaginal Lesions - None. Cervix: Characteristics - Normal.   Uterus: Characteristics - Normal.   Adnexa: - Normal.   Bladder - Normal.     Peripheral Vascular   Normal    Neuropsychiatric   Examination of related systems reveals - The patient is well-nourished and well-groomed. Mental status exam performed with findings of - Oriented X3 with appropriate mood and affect. Musculoskeletal  Normal      General Lymphatics  Normal           Medical problems and test results were reviewed with the patient today. ASSESSMENT and PLAN    1. Well woman exam with routine gynecological exam  2. Screening for malignant neoplasm of cervix  -     PAP LB, Reflex HPV ASCUS  3. Visit for screening mammogram  4. Screening mammogram for breast cancer  -     JENNY NATHAN DIGITAL SCREEN BILATERAL; Future         No follow-ups on file.        Marisol Carrillo MD  10/27/2022

## 2022-11-01 LAB
CYTOLOGIST CVX/VAG CYTO: NORMAL
CYTOLOGY CVX/VAG DOC THIN PREP: NORMAL
HPV REFLEX: NORMAL
Lab: NORMAL
Lab: NORMAL
PATH REPORT.FINAL DX SPEC: NORMAL
STAT OF ADQ CVX/VAG CYTO-IMP: NORMAL

## 2022-11-07 ENCOUNTER — OFFICE VISIT (OUTPATIENT)
Dept: ORTHOPEDIC SURGERY | Age: 72
End: 2022-11-07
Payer: MEDICARE

## 2022-11-07 DIAGNOSIS — M47.816 SPONDYLOSIS OF LUMBAR REGION WITHOUT MYELOPATHY OR RADICULOPATHY: Primary | ICD-10-CM

## 2022-11-07 DIAGNOSIS — M48.061 SPINAL STENOSIS OF LUMBAR REGION WITHOUT NEUROGENIC CLAUDICATION: ICD-10-CM

## 2022-11-07 DIAGNOSIS — M54.16 LUMBAR RADICULOPATHY: ICD-10-CM

## 2022-11-07 DIAGNOSIS — M54.50 LUMBAR BACK PAIN: ICD-10-CM

## 2022-11-07 PROCEDURE — G8428 CUR MEDS NOT DOCUMENT: HCPCS | Performed by: PHYSICAL MEDICINE & REHABILITATION

## 2022-11-07 PROCEDURE — 99213 OFFICE O/P EST LOW 20 MIN: CPT | Performed by: PHYSICAL MEDICINE & REHABILITATION

## 2022-11-07 PROCEDURE — 1036F TOBACCO NON-USER: CPT | Performed by: PHYSICAL MEDICINE & REHABILITATION

## 2022-11-07 PROCEDURE — G8484 FLU IMMUNIZE NO ADMIN: HCPCS | Performed by: PHYSICAL MEDICINE & REHABILITATION

## 2022-11-07 PROCEDURE — 1123F ACP DISCUSS/DSCN MKR DOCD: CPT | Performed by: PHYSICAL MEDICINE & REHABILITATION

## 2022-11-07 PROCEDURE — G8400 PT W/DXA NO RESULTS DOC: HCPCS | Performed by: PHYSICAL MEDICINE & REHABILITATION

## 2022-11-07 PROCEDURE — 1090F PRES/ABSN URINE INCON ASSESS: CPT | Performed by: PHYSICAL MEDICINE & REHABILITATION

## 2022-11-07 PROCEDURE — 3017F COLORECTAL CA SCREEN DOC REV: CPT | Performed by: PHYSICAL MEDICINE & REHABILITATION

## 2022-11-07 PROCEDURE — G8417 CALC BMI ABV UP PARAM F/U: HCPCS | Performed by: PHYSICAL MEDICINE & REHABILITATION

## 2022-11-07 NOTE — PROGRESS NOTES
Date:  11/07/22     HPI:  I am seeing Shelton Licea in evalution/folowup. She has pain in the lower lumbar paraspinal area that gravitates to the buttocks. From there it may go posteriorly down either leg to around the knee for which she denies neurologic issues. I cannot generate a history of a progressive gait disturbance, noting she has significant spinal stenosis on MR imaging of the lumbar spine from as recently as 2014. She gets tramadol several times a day from me with no particular side effects and she does not need refills today. She does well with translaminar epidural steroid injections when required, these most recently performed 6 months ago. Currently these are offering an 80+ percent pain reduction and as of 6 months are still doing well. She may very well need reinjection within the next month or 2. Note she is scheduled for a left TKA after the holidays. She has no particular side effects with the medications. She does not need refills today. ROS: Above    PE: Cooperative. Good strength in lower extremities. No lateralizing sensory findings. She ambulates without assistance. She has mild tenderness in the right buttock    Assessment/Plan:  PREDOMINANTLY MUSCULOSKELETAL LUMBOSACRAL  SPINE PAIN. In the context of significant spinal stenosis, she is doing very well from a neurologic standpoint. We do not need to reimage. Excellent responses to translaminar epidural steroid injections and tramadol provides a day-to-day benefit. Follow along, no changes in plan. She will call when she needs a refill on her tramadol or reinjection. I will see her back 6 months for continuity of care.     Ivett Shah MD

## 2022-12-02 ENCOUNTER — NURSE ONLY (OUTPATIENT)
Dept: FAMILY MEDICINE CLINIC | Facility: CLINIC | Age: 72
End: 2022-12-02

## 2022-12-02 DIAGNOSIS — E78.00 HYPERCHOLESTEROLEMIA: ICD-10-CM

## 2022-12-02 DIAGNOSIS — I10 BENIGN ESSENTIAL HTN: ICD-10-CM

## 2022-12-02 DIAGNOSIS — R73.02 IGT (IMPAIRED GLUCOSE TOLERANCE): ICD-10-CM

## 2022-12-02 LAB
ALBUMIN SERPL-MCNC: 3.9 G/DL (ref 3.2–4.6)
ALBUMIN/GLOB SERPL: 1.4 {RATIO} (ref 0.4–1.6)
ALP SERPL-CCNC: 72 U/L (ref 50–136)
ALT SERPL-CCNC: 26 U/L (ref 12–65)
ANION GAP SERPL CALC-SCNC: 1 MMOL/L (ref 2–11)
AST SERPL-CCNC: 19 U/L (ref 15–37)
BILIRUB SERPL-MCNC: 0.5 MG/DL (ref 0.2–1.1)
BUN SERPL-MCNC: 20 MG/DL (ref 8–23)
CALCIUM SERPL-MCNC: 9.3 MG/DL (ref 8.3–10.4)
CHLORIDE SERPL-SCNC: 106 MMOL/L (ref 101–110)
CHOLEST SERPL-MCNC: 145 MG/DL
CO2 SERPL-SCNC: 31 MMOL/L (ref 21–32)
CREAT SERPL-MCNC: 0.8 MG/DL (ref 0.6–1)
GLOBULIN SER CALC-MCNC: 2.8 G/DL (ref 2.8–4.5)
GLUCOSE SERPL-MCNC: 94 MG/DL (ref 65–100)
HDLC SERPL-MCNC: 88 MG/DL (ref 40–60)
HDLC SERPL: 1.6 {RATIO}
LDLC SERPL CALC-MCNC: 48.2 MG/DL
POTASSIUM SERPL-SCNC: 4.6 MMOL/L (ref 3.5–5.1)
PROT SERPL-MCNC: 6.7 G/DL (ref 6.3–8.2)
SODIUM SERPL-SCNC: 138 MMOL/L (ref 133–143)
TRIGL SERPL-MCNC: 44 MG/DL (ref 35–150)
VLDLC SERPL CALC-MCNC: 8.8 MG/DL (ref 6–23)

## 2022-12-03 LAB
EST. AVERAGE GLUCOSE BLD GHB EST-MCNC: 120 MG/DL
HBA1C MFR BLD: 5.8 % (ref 4.8–5.6)

## 2022-12-06 ENCOUNTER — OFFICE VISIT (OUTPATIENT)
Dept: FAMILY MEDICINE CLINIC | Facility: CLINIC | Age: 72
End: 2022-12-06
Payer: MEDICARE

## 2022-12-06 VITALS
DIASTOLIC BLOOD PRESSURE: 66 MMHG | WEIGHT: 157 LBS | HEART RATE: 79 BPM | BODY MASS INDEX: 29.64 KG/M2 | HEIGHT: 61 IN | SYSTOLIC BLOOD PRESSURE: 128 MMHG | OXYGEN SATURATION: 97 %

## 2022-12-06 DIAGNOSIS — Z79.899 HIGH RISK MEDICATION USE: ICD-10-CM

## 2022-12-06 DIAGNOSIS — F41.0 GENERALIZED ANXIETY DISORDER WITH PANIC ATTACKS: ICD-10-CM

## 2022-12-06 DIAGNOSIS — Z12.11 SCREEN FOR COLON CANCER: ICD-10-CM

## 2022-12-06 DIAGNOSIS — E66.09 CLASS 1 OBESITY DUE TO EXCESS CALORIES WITHOUT SERIOUS COMORBIDITY WITH BODY MASS INDEX (BMI) OF 30.0 TO 30.9 IN ADULT: ICD-10-CM

## 2022-12-06 DIAGNOSIS — R73.02 IGT (IMPAIRED GLUCOSE TOLERANCE): ICD-10-CM

## 2022-12-06 DIAGNOSIS — Z53.20 COLONOSCOPY REFUSED: ICD-10-CM

## 2022-12-06 DIAGNOSIS — K21.9 GASTROESOPHAGEAL REFLUX DISEASE WITHOUT ESOPHAGITIS: ICD-10-CM

## 2022-12-06 DIAGNOSIS — E78.00 HYPERCHOLESTEROLEMIA: ICD-10-CM

## 2022-12-06 DIAGNOSIS — F41.1 GENERALIZED ANXIETY DISORDER WITH PANIC ATTACKS: ICD-10-CM

## 2022-12-06 DIAGNOSIS — F33.0 MILD EPISODE OF RECURRENT MAJOR DEPRESSIVE DISORDER (HCC): ICD-10-CM

## 2022-12-06 DIAGNOSIS — I10 BENIGN ESSENTIAL HTN: Primary | ICD-10-CM

## 2022-12-06 PROBLEM — E66.3 OVERWEIGHT (BMI 25.0-29.9): Chronic | Status: ACTIVE | Noted: 2018-06-06

## 2022-12-06 PROBLEM — E66.811 CLASS 1 OBESITY DUE TO EXCESS CALORIES WITHOUT SERIOUS COMORBIDITY IN ADULT: Status: ACTIVE | Noted: 2018-06-06

## 2022-12-06 PROCEDURE — G8417 CALC BMI ABV UP PARAM F/U: HCPCS | Performed by: FAMILY MEDICINE

## 2022-12-06 PROCEDURE — 3074F SYST BP LT 130 MM HG: CPT | Performed by: FAMILY MEDICINE

## 2022-12-06 PROCEDURE — G8428 CUR MEDS NOT DOCUMENT: HCPCS | Performed by: FAMILY MEDICINE

## 2022-12-06 PROCEDURE — G8400 PT W/DXA NO RESULTS DOC: HCPCS | Performed by: FAMILY MEDICINE

## 2022-12-06 PROCEDURE — 3017F COLORECTAL CA SCREEN DOC REV: CPT | Performed by: FAMILY MEDICINE

## 2022-12-06 PROCEDURE — 1090F PRES/ABSN URINE INCON ASSESS: CPT | Performed by: FAMILY MEDICINE

## 2022-12-06 PROCEDURE — 1123F ACP DISCUSS/DSCN MKR DOCD: CPT | Performed by: FAMILY MEDICINE

## 2022-12-06 PROCEDURE — 99214 OFFICE O/P EST MOD 30 MIN: CPT | Performed by: FAMILY MEDICINE

## 2022-12-06 PROCEDURE — 1036F TOBACCO NON-USER: CPT | Performed by: FAMILY MEDICINE

## 2022-12-06 PROCEDURE — G8484 FLU IMMUNIZE NO ADMIN: HCPCS | Performed by: FAMILY MEDICINE

## 2022-12-06 PROCEDURE — 3078F DIAST BP <80 MM HG: CPT | Performed by: FAMILY MEDICINE

## 2022-12-06 ASSESSMENT — PATIENT HEALTH QUESTIONNAIRE - PHQ9
SUM OF ALL RESPONSES TO PHQ QUESTIONS 1-9: 0
SUM OF ALL RESPONSES TO PHQ9 QUESTIONS 1 & 2: 0
2. FEELING DOWN, DEPRESSED OR HOPELESS: 0
1. LITTLE INTEREST OR PLEASURE IN DOING THINGS: 0

## 2022-12-06 ASSESSMENT — ENCOUNTER SYMPTOMS
SHORTNESS OF BREATH: 0
EYE PAIN: 0
COUGH: 0
BACK PAIN: 0
ABDOMINAL PAIN: 0
CONSTIPATION: 0
SORE THROAT: 0
WHEEZING: 0
DIARRHEA: 0
VOMITING: 0

## 2022-12-06 NOTE — PROGRESS NOTES
Kary Tyler DO                Diplomate of the [employer] of [facility] [place]         [phone]    Cb Russell is a 71 y.o. female who was seen on 12/6/2022 for   Chief Complaint   Patient presents with    Annual Exam         Assessment & Plan     Diagnosis Orders   1. Benign essential HTN  Comprehensive Metabolic Panel    Well controlled        2. Hypercholesterolemia  Lipid Panel    Comprehensive Metabolic Panel    Well-controlled on current medication      3. IGT (impaired glucose tolerance)  Hemoglobin A1C    Comprehensive Metabolic Panel    Check hemoglobin A1c next year. 4. Class 1 obesity due to excess calories without serious comorbidity with body mass index (BMI) of 30.0 to 30.9 in adult      Improving. Low carb diet advised/reviewed. Information given. 5. Gastroesophageal reflux disease without esophagitis      Well-controlled      6. Mild episode of recurrent major depressive disorder ([address]      Well-controlled on current medication. 7. Generalized anxiety disorder with panic attacks      Well-controlled on current medication. 8. Colonoscopy refused        9. Screen for colon cancer  Cologuard (Fecal DNA Colorectal Cancer Screening)    Ordered Cologuard testing. 10. High risk medication use  Comprehensive Metabolic Panel        Follow-up and Dispositions    Return in about 1 year (around 12/7/2023) for ROUTINE PHI. RECENT LABS/TESTS TO REVIEW and DISCUSS    No results found for this visit on 12/06/22.      Latest Reference Range & Units 12/2/21 08:39 12/2/22 08:07   Sodium 133 - 143 mmol/L 143 138   Potassium 3.5 - 5.1 mmol/L 4.1 4.6   Chloride 101 - 110 mmol/L 98 106   CO2 21 - 32 mmol/L 29 31   BUN,BUNPL 8 - 23 MG/DL 17 20   Creatinine 0.6 - 1.0 MG/DL 0.86 0.80   Bun/Cre Ratio 12 - 28 NA 20    Anion Gap 2 - 11 mmol/L  1 (L)   Est, Glom Filt Rate >60 ml/min/1.73m2  >60 EGFR IF NonAfrican American >59 mL/min/1.73 69    GFR  >59 mL/min/1.73 79    Glucose, Random 65 - 100 mg/dL 80 94   CALCIUM, SERUM, 639794 8.3 - 10.4 MG/DL 9.8 9.3   ALBUMIN/GLOBULIN RATIO 0.4 - 1.6    1.4   Total Protein 6.3 - 8.2 g/dL 7.0 6.7   Globulin, Total 1.5 - 4.5 g/dL 2.5    Chol/HDL Ratio -    1.6   CHOLESTEROL, TOTAL, 568617 <200 MG/ 145   HDL Cholesterol 40 - 60 MG/DL 84 88 (H)   LDL Calculated <100 MG/DL 68 48.2   Triglycerides 35 - 150 MG/DL 46 44   VLDL 5 - 40 mg/dL 10    VLDL Cholesterol Calculated 6.0 - 23.0 MG/DL  8.8   Albumin 3.2 - 4.6 g/dL 4.5 3.9   Globulin 2.8 - 4.5 g/dL  2.8   Albumin/Globulin Ratio 1.2 - 2.2 NA 1.8    Alk Phos 44 - 121 IU/L 71    Alk Phosphatase 50 - 136 U/L  72   ALT 12 - 65 U/L 19 26   AST 15 - 37 U/L 25 19   Bilirubin 0.2 - 1.1 MG/DL 0.4 0.5   Hemoglobin A1C 4.8 - 5.6 % 5.9 (H) 5.8 (H)   eAG (mg/dL) mg/dL 123 120     PHQ-9  12/6/2022 12/6/2021 6/17/2021   Little interest or pleasure in doing things 0 - -   Little interest or pleasure in doing things - 0 0   Feeling down, depressed, or hopeless 0 - -   Trouble falling or staying asleep, or sleeping too much - 0 0   Feeling tired or having little energy - - 1   Poor appetite, weight loss, or overeating - 0 0   Feeling bad about yourself - or that you are a failure or have let yourself or your family down - 0 0   Trouble concentrating on things such as school, work, reading, or watching TV - 1 0   Moving or speaking so slowly that other people could have noticed; or the opposite being so fidgety that others notice - 0 0   Thoughts of being better off dead, or hurting yourself in some way - 0 0   PHQ-2 Score 0 - -   Total Score PHQ 2 - 0 0   PHQ-9 Total Score 0 - -   PHQ 9 Score - - 1   How difficult have these problems made it for you to do your work, take care of your home and get along with others - - Not difficult at all            Subjective    HPI:     This is a 30-year-old female patient here today for annual follow-up on several medical conditions. The patient had labs in advance of today's visit, the results of which were discussed with him/her at length. Needs colon cancer screening. Declines colonoscopy. Ordered Cologuard testing. The patient is due for her annual wellness visit but we are unable to perform that today because of our recent change in electronic medical records. We will schedule follow-up for this in the near future. Reviewed and updated this visit by provider:           Review of Systems   Constitutional:  Negative for fever and unexpected weight change. HENT:  Negative for congestion, ear pain and sore throat. Eyes:  Negative for pain. Respiratory:  Negative for cough, shortness of breath and wheezing. Cardiovascular:  Negative for chest pain, palpitations and leg swelling. Gastrointestinal:  Negative for abdominal pain, constipation, diarrhea and vomiting. Genitourinary:  Negative for difficulty urinating, dysuria and frequency. Musculoskeletal:  Negative for arthralgias, back pain and myalgias. Skin:  Negative for rash. Neurological:  Negative for dizziness, weakness and headaches. Psychiatric/Behavioral:  Negative for confusion and dysphoric mood. The patient is not nervous/anxious. All other systems reviewed and are negative. Objective    /66 (Site: Left Upper Arm, Position: Sitting, Cuff Size: Medium Adult)   Pulse 79   Ht 5' 0.5\" (1.537 m)   Wt 157 lb (71.2 kg)   LMP  (LMP Unknown)   SpO2 97%   BMI 30.16 kg/m²     Physical Exam  Vitals reviewed. Constitutional:       General: She is not in acute distress. Appearance: Normal appearance. HENT:      Head: Normocephalic and atraumatic. Right Ear: Tympanic membrane normal.      Left Ear: Tympanic membrane normal.      Mouth/Throat:      Mouth: Mucous membranes are moist.   Eyes:      Extraocular Movements: Extraocular movements intact.       Conjunctiva/sclera: Conjunctivae normal.      Pupils: Pupils are equal, round, and reactive to light. Neck:      Vascular: No carotid bruit. Cardiovascular:      Rate and Rhythm: Normal rate and regular rhythm. Heart sounds: Normal heart sounds. Pulmonary:      Effort: Pulmonary effort is normal.      Breath sounds: Normal breath sounds. Abdominal:      Palpations: Abdomen is soft. There is no mass. Tenderness: There is no abdominal tenderness. Musculoskeletal:      Cervical back: No tenderness. Lymphadenopathy:      Cervical: No cervical adenopathy. Neurological:      General: No focal deficit present. Mental Status: She is alert and oriented to person, place, and time. Psychiatric:         Mood and Affect: Mood normal.         Behavior: Behavior normal.         Thought Content: Thought content normal.         Judgment: Judgment normal.       On this date 12/06/2022 I have spent 31 minutes reviewing previous notes, lab/imaging results and face to face with the patient discussing the diagnoses and importance of compliance with the treatment plan, as well as documenting on the day of the visit.         DO Caitlyn Giordano [facility] [place]

## 2022-12-06 NOTE — PATIENT INSTRUCTIONS
Flu season is here. We are already having cases of influenza A in [facility]. We expect flu cases to rise this year as people relax about masking and are venturing out more even though they are sick :(    Get your flu shot here or at your local pharmacy. Now is the ideal time!

## 2022-12-19 DIAGNOSIS — M17.12 LOCALIZED OSTEOARTHRITIS OF LEFT KNEE: Primary | ICD-10-CM

## 2022-12-26 ENCOUNTER — PREP FOR PROCEDURE (OUTPATIENT)
Dept: ORTHOPEDIC SURGERY | Age: 72
End: 2022-12-26

## 2022-12-26 DIAGNOSIS — Z01.818 PREOP EXAMINATION: Primary | ICD-10-CM

## 2022-12-26 RX ORDER — SODIUM CHLORIDE 0.9 % (FLUSH) 0.9 %
5-40 SYRINGE (ML) INJECTION PRN
Status: CANCELLED | OUTPATIENT
Start: 2022-12-26

## 2022-12-26 RX ORDER — SODIUM CHLORIDE 9 MG/ML
INJECTION, SOLUTION INTRAVENOUS PRN
Status: CANCELLED | OUTPATIENT
Start: 2022-12-26

## 2022-12-26 RX ORDER — SODIUM CHLORIDE 0.9 % (FLUSH) 0.9 %
5-40 SYRINGE (ML) INJECTION EVERY 12 HOURS SCHEDULED
Status: CANCELLED | OUTPATIENT
Start: 2022-12-26

## 2022-12-27 ENCOUNTER — TELEPHONE (OUTPATIENT)
Dept: ORTHOPEDIC SURGERY | Age: 72
End: 2022-12-27

## 2022-12-30 DIAGNOSIS — M17.12 LOCALIZED OSTEOARTHRITIS OF LEFT KNEE: ICD-10-CM

## 2022-12-31 PROBLEM — Z53.20 COLONOSCOPY REFUSED: Status: ACTIVE | Noted: 2022-12-31

## 2023-01-03 ENCOUNTER — HOSPITAL ENCOUNTER (OUTPATIENT)
Dept: SURGERY | Age: 73
Discharge: HOME | End: 2023-01-06
Payer: MEDICARE

## 2023-01-03 VITALS
DIASTOLIC BLOOD PRESSURE: 68 MMHG | BODY MASS INDEX: 29.14 KG/M2 | HEART RATE: 66 BPM | WEIGHT: 154.32 LBS | TEMPERATURE: 97.2 F | HEIGHT: 61 IN | OXYGEN SATURATION: 94 % | RESPIRATION RATE: 18 BRPM | SYSTOLIC BLOOD PRESSURE: 133 MMHG

## 2023-01-03 DIAGNOSIS — R06.83 SNORING: Primary | ICD-10-CM

## 2023-01-03 DIAGNOSIS — Z01.818 PREOP EXAMINATION: ICD-10-CM

## 2023-01-03 LAB
ANION GAP SERPL CALC-SCNC: 5 MMOL/L (ref 2–11)
APTT PPP: 26.9 SEC (ref 24.5–34.2)
BACTERIA SPEC CULT: NORMAL
BASOPHILS # BLD: 0 K/UL (ref 0–0.2)
BASOPHILS NFR BLD: 1 % (ref 0–2)
BUN SERPL-MCNC: 14 MG/DL (ref 8–23)
CALCIUM SERPL-MCNC: 9.4 MG/DL (ref 8.3–10.4)
CHLORIDE SERPL-SCNC: 105 MMOL/L (ref 101–110)
CO2 SERPL-SCNC: 31 MMOL/L (ref 21–32)
CREAT SERPL-MCNC: 0.78 MG/DL (ref 0.6–1)
DIFFERENTIAL METHOD BLD: ABNORMAL
EKG ATRIAL RATE: 55 BPM
EKG DIAGNOSIS: NORMAL
EKG P AXIS: 59 DEGREES
EKG P-R INTERVAL: 146 MS
EKG Q-T INTERVAL: 446 MS
EKG QRS DURATION: 72 MS
EKG QTC CALCULATION (BAZETT): 426 MS
EKG R AXIS: 41 DEGREES
EKG T AXIS: 57 DEGREES
EKG VENTRICULAR RATE: 55 BPM
EOSINOPHIL # BLD: 0.2 K/UL (ref 0–0.8)
EOSINOPHIL NFR BLD: 2 % (ref 0.5–7.8)
ERYTHROCYTE [DISTWIDTH] IN BLOOD BY AUTOMATED COUNT: 12.8 % (ref 11.9–14.6)
EST. AVERAGE GLUCOSE BLD GHB EST-MCNC: 123 MG/DL
GLUCOSE SERPL-MCNC: 100 MG/DL (ref 65–100)
HBA1C MFR BLD: 5.9 % (ref 4.8–5.6)
HCT VFR BLD AUTO: 42.7 % (ref 35.8–46.3)
HGB BLD-MCNC: 14.2 G/DL (ref 11.7–15.4)
IMM GRANULOCYTES # BLD AUTO: 0 K/UL (ref 0–0.5)
IMM GRANULOCYTES NFR BLD AUTO: 0 % (ref 0–5)
INR PPP: 1
LYMPHOCYTES # BLD: 2 K/UL (ref 0.5–4.6)
LYMPHOCYTES NFR BLD: 28 % (ref 13–44)
MCH RBC QN AUTO: 33.6 PG (ref 26.1–32.9)
MCHC RBC AUTO-ENTMCNC: 33.3 G/DL (ref 31.4–35)
MCV RBC AUTO: 100.9 FL (ref 82–102)
MONOCYTES # BLD: 0.5 K/UL (ref 0.1–1.3)
MONOCYTES NFR BLD: 7 % (ref 4–12)
NEUTS SEG # BLD: 4.4 K/UL (ref 1.7–8.2)
NEUTS SEG NFR BLD: 62 % (ref 43–78)
NRBC # BLD: 0 K/UL (ref 0–0.2)
PLATELET # BLD AUTO: 273 K/UL (ref 150–450)
PMV BLD AUTO: 9.7 FL (ref 9.4–12.3)
POTASSIUM SERPL-SCNC: 3.7 MMOL/L (ref 3.5–5.1)
PROTHROMBIN TIME: 13.1 SEC (ref 12.6–14.3)
RBC # BLD AUTO: 4.23 M/UL (ref 4.05–5.2)
SERVICE CMNT-IMP: NORMAL
SODIUM SERPL-SCNC: 141 MMOL/L (ref 133–143)
WBC # BLD AUTO: 7.1 K/UL (ref 4.3–11.1)

## 2023-01-03 PROCEDURE — 87641 MR-STAPH DNA AMP PROBE: CPT

## 2023-01-03 PROCEDURE — 93005 ELECTROCARDIOGRAM TRACING: CPT | Performed by: ANESTHESIOLOGY

## 2023-01-03 PROCEDURE — 94760 N-INVAS EAR/PLS OXIMETRY 1: CPT

## 2023-01-03 PROCEDURE — 80048 BASIC METABOLIC PNL TOTAL CA: CPT

## 2023-01-03 PROCEDURE — 83036 HEMOGLOBIN GLYCOSYLATED A1C: CPT

## 2023-01-03 PROCEDURE — 85025 COMPLETE CBC W/AUTO DIFF WBC: CPT

## 2023-01-03 PROCEDURE — 94664 DEMO&/EVAL PT USE INHALER: CPT

## 2023-01-03 PROCEDURE — 85730 THROMBOPLASTIN TIME PARTIAL: CPT

## 2023-01-03 PROCEDURE — 85610 PROTHROMBIN TIME: CPT

## 2023-01-03 RX ORDER — AMOXICILLIN 500 MG
1200 CAPSULE ORAL 2 TIMES DAILY
COMMUNITY

## 2023-01-03 RX ORDER — TRAMADOL HYDROCHLORIDE 50 MG/1
50 TABLET ORAL 2 TIMES DAILY PRN
COMMUNITY

## 2023-01-03 RX ORDER — ASCORBIC ACID 500 MG
1000 TABLET ORAL DAILY
COMMUNITY

## 2023-01-03 RX ORDER — VITAMIN E 268 MG
400 CAPSULE ORAL DAILY
COMMUNITY

## 2023-01-03 ASSESSMENT — PAIN SCALES - GENERAL: PAINLEVEL_OUTOF10: 3

## 2023-01-03 ASSESSMENT — PAIN DESCRIPTION - LOCATION: LOCATION: KNEE

## 2023-01-03 ASSESSMENT — PAIN DESCRIPTION - DESCRIPTORS: DESCRIPTORS: ACHING

## 2023-01-03 ASSESSMENT — PULMONARY FUNCTION TESTS
FEV1 (%PREDICTED): 25
FEV1 (LITERS): 0.49

## 2023-01-03 ASSESSMENT — PAIN DESCRIPTION - ORIENTATION: ORIENTATION: LEFT

## 2023-01-03 NOTE — PERIOP NOTE
Patient verified name and . Order for consent found in EHR and matches case posting; patient verified. Type 3 surgery, walk in assessment complete. Labs per surgeon: CBC, BMP, PT/PTT, Hgb-A1c ; results pending. Labs per anesthesia protocol: no additional labs needed. EKG: completed today; anesthesia to review. No prior ekg available for comparison. Denied chest pain and sob. Patient diagnosed with sinus infection with cough on 22. Symptoms started on 22. Will finish Augmentin on 23. Still has a cough but lung sounds are clear. Bassam Guzmán NP notified. Patient seen and evaluated by RT Joce. Will have anesthesia review as well. MRSA/MSSA swab collected; pharmacy to review and dose antibiotic as appropriate. Hospital approved surgical skin cleanser and instructions to return bottle on DOS given per hospital policy. Patient provided with handouts including Guide to Surgery, Pain Management, Hand Hygiene, Blood Transfusion Education, and [facility] Brochure. Patient answered medical/surgical history questions at their best of ability. All prior to admission medications documented in [facility]. Original medication prescription bottles visualized during patient appointment. Patient instructed to hold all vitamins 3 weeks prior to surgery and NSAIDS 5 days prior to surgery. Patient teach back successful and patient demonstrates knowledge of instruction.

## 2023-01-03 NOTE — PERIOP NOTE
How to Use Your Incentive Spirometer (10 breaths twice a day starting at least a week prior to surgery)       About Your Incentive Spirometer  An incentive spirometer is a device that will expand your lungs by helping you to breathe more deeply and fully. The parts of your incentive spirometer are labeled in Figure 1. Using your incentive spirometer  When you're using your incentive spirometer, make sure to breathe through your mouth. If you breathe through your nose, the incentive spirometer won't work properly. You can hold your nose if you have trouble. DO NOT BLOW INTO THE DEVICE. If you feel dizzy at any time, stop and rest. Try again at a later time. Sit upright in a chair or in bed. Hold the incentive spirometer at eye level. Put the mouthpiece in your mouth and close your lips tightly around it. Slowly breathe out (exhale) completely. Breathe in (inhale) slowly through your mouth as deeply as you can. As you take the breath, you will see the piston rise inside the large column. While the piston rises, the indicator on the right should move upwards. It should stay in between the 2 arrows (see Figure 1). Try to get the piston as high as you can, while keeping the indicator between the arrows. If the indicator doesn't stay between the arrows, you're breathing either too fast or too slow. When you get it as high as you can, hold your breath for 10 seconds, or as long as possible. While you're holding your breath, the piston will slowly fall to the base of the spirometer. Once the piston reaches the bottom of the spirometer, breathe out slowly through your mouth. Rest for a few seconds. Repeat 10 times. Try to get the piston to the same level with each breath. After each set of 10 breaths, try to cough as coughing will help loosen or clear any mucus in your lungs. Put the marker at the level the piston reached on your incentive spirometer. This will be your goal next time.   Repeat these steps every hour that you're awake.   Cover the mouthpiece of the incentive spirometer when you aren't using it

## 2023-01-03 NOTE — PERIOP NOTE
PLEASE CONTINUE TAKING ALL PRESCRIPTION MEDICATIONS UP TO THE DAY OF SURGERY UNLESS OTHERWISE DIRECTED BELOW. DISCONTINUE all vitamins, herbals, and supplements 21 days prior to surgery. DISCONTINUE Non-Steriodal Anti-Inflammatory (NSAIDS) such as Advil, Ibuprofen, Motrin, Naproxen, Aspirin, and Aleve 5-7 days prior to surgery. Home Medications to take  the day of surgery (1/10/23)      Atorvastatin, Duloxetine, Prevacid, Tramadol if needed            Home Medications   to Hold           Comments   Take 1 capful of Miralax daily starting one week prior to surgery on 1/3/23   On the day before surgery (1/9/23)  please take Acetaminophen 1000mg in the morning and then again before bed. You may substitute for Tylenol 650 mg.    Bring: Photo ID, Insurance card, Dynahex wash, and Incentive Spirometer with you to hospital on the day of surgery. Please do not bring home medications with you on the day of surgery unless otherwise directed by your nurse. If you are instructed to bring home medications, please give them to your nurse as they will be administered by the nursing staff. If you have any questions, please call Adam Malone [phone]. A copy of this note was provided to the patient for reference.

## 2023-01-03 NOTE — PROGRESS NOTES
01/03/23 1511   Treatment   Treatment Type Bedside spirometry   Oxygen Therapy/Pulse Ox   O2 Therapy Room air   Heart Rate 66   SpO2 94 %   Pulse Oximeter Device Mode Intermittent   $Pulse Oximeter $Spot check (single)   Bedside Spirometry   FEV-1/Actual (Liters) 0.49 Liters   FEV-1/Predicted (Liters) 25 Liters   RT Misc Charges   $RT Education $HHN/MDI/IPPB Demo or Eval  (flutter valve for congested cough)     Pt's symptoms include:    Snoring  Tiredness- excessive daytime sleepiness  GERD  Neck size         35     cm  Modified Alex stage 4  SACS Score 3  STOP BANG 4  Height   5   '    1\"   Weight    154 lbs  BMI 29.16      Sleepiness Scale:     Sitting and reading 1    Watching TV 2    Sitting inactive in a public place 1    As a passenger in a car for an hour without a break 1    Lying down to rest in the afternoon when circumstances Permits 3    Sitting and talking to someone 0    Sitting quietly after lunch without alcohol 3    In a car, while stopped for a few minutes 0    Total :   11  Refer patient for sleep study based on above assessment. HST  Phone number:    [phone]                                                                                                                                            Initial respiratory Assessment completed with pt. Pt was interviewed and evaluated in Joint camp prior to surgery. Patient ID:  Kelly Meza  [id number]  12 y.o.  1950  Surgeon: Dr. Semaj Sanchez  Date of Surgery: [email or web address]  Procedure: Total Left Knee Arthroplasty  Primary Care Physician: Donaldo Rocha  Specialists:     BS:diminished  PT WENT TO [facility] FOR SINUS INFECTION &  OBTAINED ANTIBIOTIC AND IS COMPLETING LAST DOSE TOMORROW. VERY CONGESTED COUGH . PT STARTED ON FLUTTER VALVE TIMES 2O AND HAD A PRODUCTIVE COUGH. PT ENCOURAGED TO DO PULMONARY TOILET.     Pt taught proper COUGH technique  IS REVIEWED WITH PT AS WELL AS BENEFITS OF USING IS IN SEDENTARY PTS. DIAPHRAGMATIC BREATHING EXERCISE INSTRUCTIONS GIVEN    History of smokin PPD  YEARS                 Quit date:         Secondhand smoke:DENIES    Past procedures with Oxygen desaturation or delayed awakening:DENIES    Past Medical History:   Diagnosis Date    Abnormal Papanicolaou smear of cervix     Arthritis 3/24/2014    Bulging lumbar disc     Depressive disorder 3/24/2014    DVT (deep venous thrombosis) ([facility] [address] 1988    Former smoker     GERD (gastroesophageal reflux disease) 3/24/2014    Heartburn     HTN (hypertension) 2013    HX; no current treatment    Hypercholesterolemia 3/24/2014    on med for control    LGSIL on Pap smear of cervix     HX of LGSIL    Menopause     AGE 44    PUD (peptic ulcer disease)     Spinal stenosis     Spinal stenosis at L4-L5 level 2017    Followed by PEDRO Turner    Vitamin D deficiency 3/24/2014    HX OF COVID 2020- LASTED 8 WEEKS. Respiratory history:DENIES SOB                                                                    Respiratory meds:  DENIES  PT WOULD BENEFIT FROM MDI    FAMILY PRESENT:             NO     PAST SLEEP STUDY:                   DENIES  HX OF JAMAL:                         SISTER HAD JAMAL            DENIES  JAMAL assessment:     DANGERS OF UNTREATED JAMAL EXPLAINED TO PT.                                          SLEEPS ON SIDE       &      BACK           PHYSICAL EXAM   Body mass index is 29.16 kg/m².    Vitals:    23 1511   BP:    Pulse: (P) 66   Resp:    Temp:    SpO2: (P) 94%     Neck circumference:  35    cm    Loud snoring:                                                 YES            Witnessed apnea or wakening gasping or choking:        DENIES         Awakens with headaches:                                               DENIES  Morning or daytime tiredness/ sleepiness:                           TIRED- NAPS DAILY 45 MIN TO 1 HOUR  Dry mouth or sore throat in morning:            YES Cookie Noon stage:  4                                   SACS score:3  Stop Bang                                CS [facility]  RESPIRATORY ASSESSMENT Q SHIFT   O2 PRN    ALBUTEROL  NEBULIZER Q6 PRN WHEEZING                                               Referrals:  HST  Pt.  Phone Number:

## 2023-01-04 NOTE — PROGRESS NOTES
Total Joint Surgery Preoperative Chart Review      Patient ID:  Ollie Zapata  [id number]  77 y.o.  1950  Surgeon: Dr. Melany Stevens  Date of Surgery: 1/10/2022  Procedure: Total Right Knee Arthroplasty  Primary Care Physician: Bhavin CamarenaHermosa [phone]  Specialty Physician(s):      Subjective:   Ollie Zapata is a 71 y.o. White (non-) female who presents for preoperative evaluation for Total Right Knee arthroplasty. This is a preoperative chart review note based on data collected by the nurse at the surgical Pre-Assessment visit. Past Medical History:   Diagnosis Date    Abnormal Papanicolaou smear of cervix     Arthritis 3/24/2014    Bulging lumbar disc     Depressive disorder 3/24/2014    DVT (deep venous thrombosis) ([facility] [address] 1988    Former smoker     GERD (gastroesophageal reflux disease) 3/24/2014    on med for control    Heartburn     HTN (hypertension) 2013    HX; no current treatment    Hypercholesterolemia 3/24/2014    on med for control    LGSIL on Pap smear of cervix     HX of LGSIL    Menopause     AGE 44    PUD (peptic ulcer disease)     Spinal stenosis     Spinal stenosis at L4-L5 level 2017    Followed by PEDRO Serrano    Vitamin D deficiency 3/24/2014      Past Surgical History:   Procedure Laterality Date    CHOLECYSTECTOMY      COLPOSCOPY  2013    times two    LAP,TUBAL CAUTERY      TONSILLECTOMY       Family History   Problem Relation Age of Onset    Cancer Sister         breast    Heart Disease Father     Heart Attack Father             Diabetes Maternal Grandmother     Heart Disease Brother     Stroke Sister     Breast Cancer Sister 76    Cancer Mother         cervical      Social History     Tobacco Use    Smoking status: Former     Packs/day: 1.50     Years: 35.00     Pack years: 52.50     Types: Cigarettes     Quit date: 10/1/2012     Years since quitting: 10.2    Smokeless tobacco: Former   Substance Use Topics    Alcohol use:  No Alcohol/week: 0.0 standard drinks       Prior to Admission medications    Medication Sig Start Date End Date Taking? Authorizing Provider   traMADol (ULTRAM) 50 MG tablet Take 50 mg by mouth 2 times daily as needed for Pain. Yes Historical Provider, MD   vitamin C (ASCORBIC ACID) 500 MG tablet Take 1,000 mg by mouth daily   Yes Historical Provider, MD   vitamin E 400 UNIT capsule Take 400 Units by mouth daily   Yes Historical Provider, MD   Omega-3 Fatty Acids (FISH OIL) 1200 MG CAPS Take 1,200 mg by mouth in the morning and at bedtime   Yes Historical Provider, MD   atorvastatin (LIPITOR) 20 MG tablet Take 1 tablet by mouth daily 8/19/22   Kaela Franz DO   DULoxetine (CYMBALTA) 30 MG extended release capsule Take 1 capsule by mouth daily 8/19/22   Kaela Franz DO   DULoxetine (CYMBALTA) 60 MG extended release capsule Take 1 capsule by mouth daily 8/19/22 1/3/23  Kaela Franz DO   furosemide (LASIX) 40 MG tablet Take 1 tablet by mouth daily TAKE 1 TABLET EVERY DAY 7/5/22   Kaela Franz DO   lansoprazole (PREVACID) 30 MG delayed release capsule Take 1 capsule by mouth daily TAKE 1 CAPSULE DAILY (BEFORE BREAKFAST). 7/5/22   Kaela Franz DO   Cholecalciferol 50 MCG (2000 UT) CAPS Take 5,000 Units by mouth    Ar Automatic Reconciliation     Allergies   Allergen Reactions    Sulfa Antibiotics Nausea And Vomiting and Rash          Objective:     Physical Exam:   Patient Vitals for the past 24 hrs:   Temp Pulse Resp BP SpO2   01/03/23 1511 -- 66 -- -- 94 %   01/03/23 1332 97.2 °F (36.2 °C) 67 18 133/68 94 %       ECG:    No results found for this or any previous visit (from the past 4464 hour(s)).     Data Review:   Labs:   Recent Results (from the past 24 hour(s))   Protime-INR    Collection Time: 01/03/23  2:02 PM   Result Value Ref Range    Protime 13.1 12.6 - 14.3 sec    INR 1.0     Hemoglobin A1c    Collection Time: 01/03/23  2:02 PM   Result Value Ref Range    Hemoglobin A1C 5.9 (H) 4.8 - 5.6 %    eAG 123 mg/dL   CBC with Auto Differential    Collection Time: 01/03/23  2:02 PM   Result Value Ref Range    WBC 7.1 4.3 - 11.1 K/uL    RBC 4.23 4.05 - 5.2 M/uL    Hemoglobin 14.2 11.7 - 15.4 g/dL    Hematocrit 42.7 35.8 - 46.3 %    .9 82.0 - 102.0 FL    MCH 33.6 (H) 26.1 - 32.9 PG    MCHC 33.3 31.4 - 35.0 g/dL    RDW 12.8 11.9 - 14.6 %    Platelets 219 471 - 834 K/uL    MPV 9.7 9.4 - 12.3 FL    nRBC 0.00 0.0 - 0.2 K/uL    Differential Type AUTOMATED      Seg Neutrophils 62 43 - 78 %    Lymphocytes 28 13 - 44 %    Monocytes 7 4.0 - 12.0 %    Eosinophils % 2 0.5 - 7.8 %    Basophils 1 0.0 - 2.0 %    Immature Granulocytes 0 0.0 - 5.0 %    Segs Absolute 4.4 1.7 - 8.2 K/UL    Absolute Lymph # 2.0 0.5 - 4.6 K/UL    Absolute Mono # 0.5 0.1 - 1.3 K/UL    Absolute Eos # 0.2 0.0 - 0.8 K/UL    Basophils Absolute 0.0 0.0 - 0.2 K/UL    Absolute Immature Granulocyte 0.0 0.0 - 0.5 K/UL   Basic Metabolic Panel    Collection Time: 01/03/23  2:02 PM   Result Value Ref Range    Sodium 141 133 - 143 mmol/L    Potassium 3.7 3.5 - 5.1 mmol/L    Chloride 105 101 - 110 mmol/L    CO2 31 21 - 32 mmol/L    Anion Gap 5 2 - 11 mmol/L    Glucose 100 65 - 100 mg/dL    BUN 14 8 - 23 MG/DL    Creatinine 0.78 0.6 - 1.0 MG/DL    Est, Glom Filt Rate >60 >60 ml/min/1.73m2    Calcium 9.4 8.3 - 10.4 MG/DL   APTT    Collection Time: 01/03/23  2:02 PM   Result Value Ref Range    PTT 26.9 24.5 - 34.2 SEC   MSSA/MRSA Screen BY PCR    Collection Time: 01/03/23  2:02 PM    Specimen: Nares; Swab   Result Value Ref Range    Special Requests NO SPECIAL REQUESTS      Culture        SA target not detected. A MRSA NEGATIVE, SA NEGATIVE test result does not preclude MRSA or SA nasal colonization.    EKG 12 Lead    Collection Time: 01/03/23  2:28 PM   Result Value Ref Range    Ventricular Rate 55 BPM    Atrial Rate 55 BPM    P-R Interval 146 ms    QRS Duration 72 ms    Q-T Interval 446 ms    QTc Calculation (Parmjitzett) 426 ms    P Axis 59 degrees    R Axis 41 degrees    T Axis 57 degrees    Diagnosis Sinus bradycardia          Problem List:  )  Patient Active Problem List   Diagnosis    Lumbosacral radiculopathy    COVID-19 vaccination refused    FIDENCIO I (cervical intraepithelial neoplasia I)    Stopped smoking with greater than 30 pack year history    Low grade squamous intraepithelial lesion (LGSIL) on cervical Pap smear    Bilateral carpal tunnel syndrome    Benign essential HTN    Spinal stenosis at L4-L5 level    Mammogram declined    Generalized anxiety disorder with panic attacks    Vitamin D deficiency    Gastroesophageal reflux disease without esophagitis    Hypercholesterolemia    IGT (impaired glucose tolerance)    Hyperkalemia    High risk medication use    Class 1 obesity due to excess calories without serious comorbidity with body mass index (BMI) of 30.0 to 30.9 in adult    Alopecia    Mild episode of recurrent major depressive disorder (HCC)    Cervical stenosis (uterine cervix)    Degenerative arthritis of left knee    Colonoscopy refused       Total Joint Surgery Pre-Assessment Recommendations:           Patient finishing up antibiotics for URI continues with productive cough. Anesthesia will review chart. WBC WNL. Albuterol every 6 hours as need during hospitalization. Patient reports the symptoms of snoring, fatigue, observed apnea and /or excessive daytime sleepiness. Will refer patient for HST based on above assessment. Recommend continuous saturation monitoring hours of sleep, during hospitalization.       Signed By: VICTOR MANUEL Kaufman - CNP-C    January 4, 2023

## 2023-01-05 ENCOUNTER — HOSPITAL ENCOUNTER (OUTPATIENT)
Dept: MAMMOGRAPHY | Age: 73
Discharge: HOME | End: 2023-01-05
Payer: MEDICARE

## 2023-01-05 PROCEDURE — 77067 SCR MAMMO BI INCL CAD: CPT

## 2023-01-09 ENCOUNTER — ANESTHESIA EVENT (OUTPATIENT)
Dept: SURGERY | Age: 73
End: 2023-01-09
Payer: MEDICARE

## 2023-01-09 ENCOUNTER — OFFICE VISIT (OUTPATIENT)
Dept: ORTHOPEDIC SURGERY | Age: 73
End: 2023-01-09

## 2023-01-09 DIAGNOSIS — M17.12 OSTEOARTHRITIS OF LEFT KNEE, UNSPECIFIED OSTEOARTHRITIS TYPE: Primary | ICD-10-CM

## 2023-01-09 PROCEDURE — PREOPEXAM PRE-OP EXAM: Performed by: PHYSICIAN ASSISTANT

## 2023-01-09 NOTE — H&P
[address] [facility]  History and Physical Exam    Patient ID:  Rana Closs  [id number]    89 y.o.  1950    Today: January 9, 2023    Vitals Signs: Reviewed as noted in medical record. Allergies: Allergies   Allergen Reactions    Sulfa Antibiotics Nausea And Vomiting and Rash       CC: Left knee pain    HPI:  Pt complains of left knee pain and difficulty ambulating. Relevant PMH:   Past Medical History:   Diagnosis Date    Abnormal Papanicolaou smear of cervix     Arthritis 3/24/2014    Bulging lumbar disc     Depressive disorder 3/24/2014    DVT (deep venous thrombosis) ([facility] [address] 1988    Former smoker     GERD (gastroesophageal reflux disease) 3/24/2014    on med for control    Heartburn     HTN (hypertension) 8/9/2013    HX; no current treatment    Hypercholesterolemia 3/24/2014    on med for control    LGSIL on Pap smear of cervix     HX of LGSIL    Menopause     AGE 44    PUD (peptic ulcer disease)     Spinal stenosis     Spinal stenosis at L4-L5 level 9/29/2017    Followed by PEDRO Soares    Vitamin D deficiency 3/24/2014       Objective:                    HEENT: NC/AT                   Lungs:  clear                   Heart:   rrr                   Abdomen: soft                   Extremities:  Pain with rom of the left knee joint    Radiographs: reveal left knee osteoarthritis with loss of joint space and bone spurs. Assessment: Degenerative arthritis of left knee [M17.12]    Plan:  Proceed with scheduled Procedure(s) (LRB):  KNEE TOTAL ARTHROPLASTY ROBOTIC SDD (Left) . The patient has failed conservative treatment including NSAIDS, and injections. Total joint replacement surgery and associated risks and benefits have been discussed with the patient along with implant selection including but not limited to [place] and [employer]uy total joint systems. Due to the amount of pain the patient is experiencing we will proceed with the scheduled procedure.   Will plan for same day discharge.      Signed By: Debo Deshpande  January 9, 2023

## 2023-01-09 NOTE — PROGRESS NOTES
Name: Livia Murdock  YOB: 1950  Gender: female  MRN: [id number]      Current Outpatient Medications:     traMADol (ULTRAM) 50 MG tablet, Take 50 mg by mouth 2 times daily as needed for Pain., Disp: , Rfl:     vitamin C (ASCORBIC ACID) 500 MG tablet, Take 1,000 mg by mouth daily, Disp: , Rfl:     vitamin E 400 UNIT capsule, Take 400 Units by mouth daily, Disp: , Rfl:     Omega-3 Fatty Acids (FISH OIL) 1200 MG CAPS, Take 1,200 mg by mouth in the morning and at bedtime, Disp: , Rfl:     atorvastatin (LIPITOR) 20 MG tablet, Take 1 tablet by mouth daily, Disp: 90 tablet, Rfl: 1    DULoxetine (CYMBALTA) 30 MG extended release capsule, Take 1 capsule by mouth daily, Disp: 90 capsule, Rfl: 1    DULoxetine (CYMBALTA) 60 MG extended release capsule, Take 1 capsule by mouth daily, Disp: 90 capsule, Rfl: 1    furosemide (LASIX) 40 MG tablet, Take 1 tablet by mouth daily TAKE 1 TABLET EVERY DAY, Disp: 90 tablet, Rfl: 3    lansoprazole (PREVACID) 30 MG delayed release capsule, Take 1 capsule by mouth daily TAKE 1 CAPSULE DAILY (BEFORE BREAKFAST). , Disp: 90 capsule, Rfl: 3    Cholecalciferol 50 MCG (2000 UT) CAPS, Take 5,000 Units by mouth, Disp: , Rfl:   Allergies   Allergen Reactions    Sulfa Antibiotics Nausea And Vomiting and Rash       Pre-op  exam Left TKA  CC:  Left knee pain    History:  Patient returns today for pre-op exam prior to Left TKA. See formal H&P in Epic chart.

## 2023-01-10 ENCOUNTER — HOSPITAL ENCOUNTER (OUTPATIENT)
Age: 73
Setting detail: OUTPATIENT SURGERY
Discharge: HOME | End: 2023-01-10
Attending: ORTHOPAEDIC SURGERY | Admitting: ORTHOPAEDIC SURGERY
Payer: MEDICARE

## 2023-01-10 ENCOUNTER — HOME HEALTH ADMISSION (OUTPATIENT)
Dept: HOME HEALTH SERVICES | Facility: HOME HEALTH | Age: 73
End: 2023-01-10
Payer: MEDICARE

## 2023-01-10 ENCOUNTER — ANESTHESIA (OUTPATIENT)
Dept: SURGERY | Age: 73
End: 2023-01-10
Payer: MEDICARE

## 2023-01-10 VITALS
TEMPERATURE: 97.7 F | BODY MASS INDEX: 29.85 KG/M2 | WEIGHT: 158 LBS | SYSTOLIC BLOOD PRESSURE: 97 MMHG | DIASTOLIC BLOOD PRESSURE: 61 MMHG | RESPIRATION RATE: 16 BRPM | HEART RATE: 82 BPM | OXYGEN SATURATION: 96 %

## 2023-01-10 DIAGNOSIS — M17.12 PRIMARY OSTEOARTHRITIS OF LEFT KNEE: Primary | ICD-10-CM

## 2023-01-10 PROCEDURE — 27447 TOTAL KNEE ARTHROPLASTY: CPT | Performed by: ORTHOPAEDIC SURGERY

## 2023-01-10 PROCEDURE — 20985 CPTR-ASST DIR MS PX: CPT | Performed by: ORTHOPAEDIC SURGERY

## 2023-01-10 PROCEDURE — 97161 PT EVAL LOW COMPLEX 20 MIN: CPT

## 2023-01-10 PROCEDURE — 6360000002 HC RX W HCPCS

## 2023-01-10 PROCEDURE — 27447 TOTAL KNEE ARTHROPLASTY: CPT | Performed by: PHYSICIAN ASSISTANT

## 2023-01-10 PROCEDURE — 2580000003 HC RX 258: Performed by: ORTHOPAEDIC SURGERY

## 2023-01-10 PROCEDURE — 6370000000 HC RX 637 (ALT 250 FOR IP): Performed by: ANESTHESIOLOGY

## 2023-01-10 PROCEDURE — 6360000002 HC RX W HCPCS: Performed by: PHYSICIAN ASSISTANT

## 2023-01-10 PROCEDURE — 3700000000 HC ANESTHESIA ATTENDED CARE: Performed by: ORTHOPAEDIC SURGERY

## 2023-01-10 PROCEDURE — 64447 NJX AA&/STRD FEMORAL NRV IMG: CPT | Performed by: ANESTHESIOLOGY

## 2023-01-10 PROCEDURE — 2500000003 HC RX 250 WO HCPCS: Performed by: ORTHOPAEDIC SURGERY

## 2023-01-10 PROCEDURE — 2500000003 HC RX 250 WO HCPCS

## 2023-01-10 PROCEDURE — 7100000001 HC PACU RECOVERY - ADDTL 15 MIN: Performed by: ORTHOPAEDIC SURGERY

## 2023-01-10 PROCEDURE — 3600000005 HC SURGERY LEVEL 5 BASE: Performed by: ORTHOPAEDIC SURGERY

## 2023-01-10 PROCEDURE — 2720000010 HC SURG SUPPLY STERILE: Performed by: ORTHOPAEDIC SURGERY

## 2023-01-10 PROCEDURE — C1776 JOINT DEVICE (IMPLANTABLE): HCPCS | Performed by: ORTHOPAEDIC SURGERY

## 2023-01-10 PROCEDURE — 2500000003 HC RX 250 WO HCPCS: Performed by: ANESTHESIOLOGY

## 2023-01-10 PROCEDURE — 2580000003 HC RX 258: Performed by: ANESTHESIOLOGY

## 2023-01-10 PROCEDURE — 97535 SELF CARE MNGMENT TRAINING: CPT

## 2023-01-10 PROCEDURE — 6360000002 HC RX W HCPCS: Performed by: ORTHOPAEDIC SURGERY

## 2023-01-10 PROCEDURE — 7100000000 HC PACU RECOVERY - FIRST 15 MIN: Performed by: ORTHOPAEDIC SURGERY

## 2023-01-10 PROCEDURE — 6360000002 HC RX W HCPCS: Performed by: ANESTHESIOLOGY

## 2023-01-10 PROCEDURE — 3700000001 HC ADD 15 MINUTES (ANESTHESIA): Performed by: ORTHOPAEDIC SURGERY

## 2023-01-10 PROCEDURE — 6370000000 HC RX 637 (ALT 250 FOR IP): Performed by: PHYSICIAN ASSISTANT

## 2023-01-10 PROCEDURE — 2709999900 HC NON-CHARGEABLE SUPPLY: Performed by: ORTHOPAEDIC SURGERY

## 2023-01-10 PROCEDURE — C1713 ANCHOR/SCREW BN/BN,TIS/BN: HCPCS | Performed by: ORTHOPAEDIC SURGERY

## 2023-01-10 PROCEDURE — 3600000015 HC SURGERY LEVEL 5 ADDTL 15MIN: Performed by: ORTHOPAEDIC SURGERY

## 2023-01-10 PROCEDURE — 97530 THERAPEUTIC ACTIVITIES: CPT

## 2023-01-10 PROCEDURE — 97165 OT EVAL LOW COMPLEX 30 MIN: CPT

## 2023-01-10 DEVICE — TIBIAL COMPONENT
Type: IMPLANTABLE DEVICE | Site: KNEE | Status: FUNCTIONAL
Brand: TRIATHLON

## 2023-01-10 DEVICE — COMPONENT TOT KNEE CAPPED PRIMARY K2STRYKER] STRYKER CORP]: Type: IMPLANTABLE DEVICE | Status: FUNCTIONAL

## 2023-01-10 DEVICE — TIBIAL BEARING INSERT - CS
Type: IMPLANTABLE DEVICE | Site: KNEE | Status: FUNCTIONAL
Brand: TRIATHLON

## 2023-01-10 DEVICE — CRUCIATE RETAINING FEMORAL
Type: IMPLANTABLE DEVICE | Site: KNEE | Status: FUNCTIONAL
Brand: TRIATHLON

## 2023-01-10 RX ORDER — SODIUM CHLORIDE 0.9 % (FLUSH) 0.9 %
5-40 SYRINGE (ML) INJECTION PRN
Status: CANCELLED | OUTPATIENT
Start: 2023-01-10

## 2023-01-10 RX ORDER — NEOSTIGMINE METHYLSULFATE 1 MG/ML
INJECTION, SOLUTION INTRAVENOUS PRN
Status: DISCONTINUED | OUTPATIENT
Start: 2023-01-10 | End: 2023-01-10 | Stop reason: SDUPTHER

## 2023-01-10 RX ORDER — LIDOCAINE HYDROCHLORIDE 10 MG/ML
1 INJECTION, SOLUTION INFILTRATION; PERINEURAL
Status: DISCONTINUED | OUTPATIENT
Start: 2023-01-10 | End: 2023-01-10

## 2023-01-10 RX ORDER — SODIUM CHLORIDE 9 MG/ML
INJECTION, SOLUTION INTRAVENOUS CONTINUOUS
Status: DISCONTINUED | OUTPATIENT
Start: 2023-01-10 | End: 2023-01-10 | Stop reason: HOSPADM

## 2023-01-10 RX ORDER — PANTOPRAZOLE SODIUM 40 MG/1
40 TABLET, DELAYED RELEASE ORAL
Status: DISCONTINUED | OUTPATIENT
Start: 2023-01-11 | End: 2023-01-10 | Stop reason: HOSPADM

## 2023-01-10 RX ORDER — ACETAMINOPHEN 325 MG/1
650 TABLET ORAL EVERY 6 HOURS
Status: DISCONTINUED | OUTPATIENT
Start: 2023-01-10 | End: 2023-01-10 | Stop reason: HOSPADM

## 2023-01-10 RX ORDER — SENNA AND DOCUSATE SODIUM 50; 8.6 MG/1; MG/1
1 TABLET, FILM COATED ORAL 2 TIMES DAILY
Status: DISCONTINUED | OUTPATIENT
Start: 2023-01-10 | End: 2023-01-10 | Stop reason: HOSPADM

## 2023-01-10 RX ORDER — PROPOFOL 10 MG/ML
INJECTION, EMULSION INTRAVENOUS PRN
Status: DISCONTINUED | OUTPATIENT
Start: 2023-01-10 | End: 2023-01-10 | Stop reason: SDUPTHER

## 2023-01-10 RX ORDER — HYDROMORPHONE HYDROCHLORIDE 1 MG/ML
0.5 INJECTION, SOLUTION INTRAMUSCULAR; INTRAVENOUS; SUBCUTANEOUS EVERY 5 MIN PRN
Status: DISCONTINUED | OUTPATIENT
Start: 2023-01-10 | End: 2023-01-10 | Stop reason: HOSPADM

## 2023-01-10 RX ORDER — SODIUM CHLORIDE 0.9 % (FLUSH) 0.9 %
5-40 SYRINGE (ML) INJECTION PRN
Status: DISCONTINUED | OUTPATIENT
Start: 2023-01-10 | End: 2023-01-10

## 2023-01-10 RX ORDER — ALBUTEROL SULFATE 2.5 MG/3ML
2.5 SOLUTION RESPIRATORY (INHALATION) EVERY 6 HOURS PRN
Status: DISCONTINUED | OUTPATIENT
Start: 2023-01-10 | End: 2023-01-10 | Stop reason: HOSPADM

## 2023-01-10 RX ORDER — OXYCODONE HYDROCHLORIDE 5 MG/1
5-10 TABLET ORAL EVERY 4 HOURS PRN
Qty: 60 TABLET | Refills: 0 | Status: SHIPPED | OUTPATIENT
Start: 2023-01-10 | End: 2023-01-15

## 2023-01-10 RX ORDER — SODIUM CHLORIDE, SODIUM LACTATE, POTASSIUM CHLORIDE, CALCIUM CHLORIDE 600; 310; 30; 20 MG/100ML; MG/100ML; MG/100ML; MG/100ML
INJECTION, SOLUTION INTRAVENOUS CONTINUOUS
Status: DISCONTINUED | OUTPATIENT
Start: 2023-01-10 | End: 2023-01-10

## 2023-01-10 RX ORDER — ROCURONIUM BROMIDE 10 MG/ML
INJECTION, SOLUTION INTRAVENOUS PRN
Status: DISCONTINUED | OUTPATIENT
Start: 2023-01-10 | End: 2023-01-10 | Stop reason: SDUPTHER

## 2023-01-10 RX ORDER — HYDROMORPHONE HYDROCHLORIDE 1 MG/ML
INJECTION, SOLUTION INTRAMUSCULAR; INTRAVENOUS; SUBCUTANEOUS PRN
Status: DISCONTINUED | OUTPATIENT
Start: 2023-01-10 | End: 2023-01-10 | Stop reason: SDUPTHER

## 2023-01-10 RX ORDER — ONDANSETRON 4 MG/1
4 TABLET, ORALLY DISINTEGRATING ORAL EVERY 8 HOURS PRN
Status: DISCONTINUED | OUTPATIENT
Start: 2023-01-10 | End: 2023-01-10 | Stop reason: HOSPADM

## 2023-01-10 RX ORDER — LIDOCAINE HYDROCHLORIDE 20 MG/ML
INJECTION, SOLUTION EPIDURAL; INFILTRATION; INTRACAUDAL; PERINEURAL PRN
Status: DISCONTINUED | OUTPATIENT
Start: 2023-01-10 | End: 2023-01-10 | Stop reason: SDUPTHER

## 2023-01-10 RX ORDER — SODIUM CHLORIDE 9 MG/ML
INJECTION, SOLUTION INTRAVENOUS PRN
Status: DISCONTINUED | OUTPATIENT
Start: 2023-01-10 | End: 2023-01-10

## 2023-01-10 RX ORDER — MIDAZOLAM HYDROCHLORIDE 2 MG/2ML
2 INJECTION, SOLUTION INTRAMUSCULAR; INTRAVENOUS
Status: COMPLETED | OUTPATIENT
Start: 2023-01-10 | End: 2023-01-10

## 2023-01-10 RX ORDER — OXYCODONE HYDROCHLORIDE 5 MG/1
5 TABLET ORAL
Status: COMPLETED | OUTPATIENT
Start: 2023-01-10 | End: 2023-01-10

## 2023-01-10 RX ORDER — ASPIRIN 81 MG/1
81 TABLET ORAL 2 TIMES DAILY
Qty: 70 TABLET | Refills: 0 | Status: SHIPPED | OUTPATIENT
Start: 2023-01-10 | End: 2023-02-14

## 2023-01-10 RX ORDER — SODIUM CHLORIDE 9 MG/ML
INJECTION, SOLUTION INTRAVENOUS PRN
Status: DISCONTINUED | OUTPATIENT
Start: 2023-01-10 | End: 2023-01-10 | Stop reason: HOSPADM

## 2023-01-10 RX ORDER — ONDANSETRON 4 MG/1
4 TABLET, ORALLY DISINTEGRATING ORAL EVERY 8 HOURS PRN
Qty: 20 TABLET | Refills: 0 | Status: SHIPPED | OUTPATIENT
Start: 2023-01-10

## 2023-01-10 RX ORDER — ONDANSETRON 2 MG/ML
4 INJECTION INTRAMUSCULAR; INTRAVENOUS EVERY 6 HOURS PRN
Status: DISCONTINUED | OUTPATIENT
Start: 2023-01-10 | End: 2023-01-10 | Stop reason: HOSPADM

## 2023-01-10 RX ORDER — HYDROMORPHONE HYDROCHLORIDE 2 MG/ML
0.5 INJECTION, SOLUTION INTRAMUSCULAR; INTRAVENOUS; SUBCUTANEOUS
Status: DISCONTINUED | OUTPATIENT
Start: 2023-01-10 | End: 2023-01-10 | Stop reason: HOSPADM

## 2023-01-10 RX ORDER — OXYCODONE HYDROCHLORIDE 5 MG/1
10 TABLET ORAL EVERY 4 HOURS PRN
Status: DISCONTINUED | OUTPATIENT
Start: 2023-01-10 | End: 2023-01-10 | Stop reason: HOSPADM

## 2023-01-10 RX ORDER — FENTANYL CITRATE 50 UG/ML
100 INJECTION, SOLUTION INTRAMUSCULAR; INTRAVENOUS
Status: COMPLETED | OUTPATIENT
Start: 2023-01-10 | End: 2023-01-10

## 2023-01-10 RX ORDER — ACETAMINOPHEN 500 MG
1000 TABLET ORAL EVERY 6 HOURS PRN
COMMUNITY

## 2023-01-10 RX ORDER — SODIUM CHLORIDE 0.9 % (FLUSH) 0.9 %
5-40 SYRINGE (ML) INJECTION EVERY 12 HOURS SCHEDULED
Status: DISCONTINUED | OUTPATIENT
Start: 2023-01-10 | End: 2023-01-10

## 2023-01-10 RX ORDER — EPHEDRINE SULFATE/0.9% NACL/PF 50 MG/5 ML
SYRINGE (ML) INTRAVENOUS PRN
Status: DISCONTINUED | OUTPATIENT
Start: 2023-01-10 | End: 2023-01-10 | Stop reason: SDUPTHER

## 2023-01-10 RX ORDER — GLYCOPYRROLATE 0.2 MG/ML
INJECTION INTRAMUSCULAR; INTRAVENOUS PRN
Status: DISCONTINUED | OUTPATIENT
Start: 2023-01-10 | End: 2023-01-10 | Stop reason: SDUPTHER

## 2023-01-10 RX ORDER — TRANEXAMIC ACID 100 MG/ML
INJECTION, SOLUTION INTRAVENOUS PRN
Status: DISCONTINUED | OUTPATIENT
Start: 2023-01-10 | End: 2023-01-10 | Stop reason: SDUPTHER

## 2023-01-10 RX ORDER — DULOXETIN HYDROCHLORIDE 30 MG/1
30 CAPSULE, DELAYED RELEASE ORAL DAILY
Status: DISCONTINUED | OUTPATIENT
Start: 2023-01-10 | End: 2023-01-10 | Stop reason: HOSPADM

## 2023-01-10 RX ORDER — FENTANYL CITRATE 50 UG/ML
INJECTION, SOLUTION INTRAMUSCULAR; INTRAVENOUS PRN
Status: DISCONTINUED | OUTPATIENT
Start: 2023-01-10 | End: 2023-01-10 | Stop reason: SDUPTHER

## 2023-01-10 RX ORDER — FUROSEMIDE 40 MG/1
40 TABLET ORAL DAILY
Status: DISCONTINUED | OUTPATIENT
Start: 2023-01-10 | End: 2023-01-10 | Stop reason: HOSPADM

## 2023-01-10 RX ORDER — VANCOMYCIN HYDROCHLORIDE 1 G/20ML
INJECTION, POWDER, LYOPHILIZED, FOR SOLUTION INTRAVENOUS PRN
Status: DISCONTINUED | OUTPATIENT
Start: 2023-01-10 | End: 2023-01-10 | Stop reason: HOSPADM

## 2023-01-10 RX ORDER — KETOROLAC TROMETHAMINE 30 MG/ML
INJECTION, SOLUTION INTRAMUSCULAR; INTRAVENOUS PRN
Status: DISCONTINUED | OUTPATIENT
Start: 2023-01-10 | End: 2023-01-10 | Stop reason: HOSPADM

## 2023-01-10 RX ORDER — ACETAMINOPHEN 500 MG
1000 TABLET ORAL ONCE
Status: DISCONTINUED | OUTPATIENT
Start: 2023-01-10 | End: 2023-01-10 | Stop reason: HOSPADM

## 2023-01-10 RX ORDER — SODIUM CHLORIDE 0.9 % (FLUSH) 0.9 %
5-40 SYRINGE (ML) INJECTION EVERY 12 HOURS SCHEDULED
Status: CANCELLED | OUTPATIENT
Start: 2023-01-10

## 2023-01-10 RX ORDER — ASPIRIN 81 MG/1
81 TABLET ORAL 2 TIMES DAILY
Status: DISCONTINUED | OUTPATIENT
Start: 2023-01-10 | End: 2023-01-10 | Stop reason: HOSPADM

## 2023-01-10 RX ORDER — METHOCARBAMOL 750 MG/1
750 TABLET, FILM COATED ORAL 4 TIMES DAILY PRN
Qty: 30 TABLET | Refills: 0 | Status: SHIPPED | OUTPATIENT
Start: 2023-01-10 | End: 2023-01-20

## 2023-01-10 RX ORDER — ROPIVACAINE HYDROCHLORIDE 2 MG/ML
INJECTION, SOLUTION EPIDURAL; INFILTRATION; PERINEURAL PRN
Status: DISCONTINUED | OUTPATIENT
Start: 2023-01-10 | End: 2023-01-10 | Stop reason: HOSPADM

## 2023-01-10 RX ORDER — METHOCARBAMOL 750 MG/1
750 TABLET, FILM COATED ORAL 4 TIMES DAILY PRN
Status: DISCONTINUED | OUTPATIENT
Start: 2023-01-10 | End: 2023-01-10 | Stop reason: HOSPADM

## 2023-01-10 RX ORDER — POLYETHYLENE GLYCOL 3350 17 G/17G
17 POWDER, FOR SOLUTION ORAL DAILY PRN
Status: DISCONTINUED | OUTPATIENT
Start: 2023-01-10 | End: 2023-01-10 | Stop reason: HOSPADM

## 2023-01-10 RX ORDER — PROCHLORPERAZINE EDISYLATE 5 MG/ML
5 INJECTION INTRAMUSCULAR; INTRAVENOUS
Status: DISCONTINUED | OUTPATIENT
Start: 2023-01-10 | End: 2023-01-10 | Stop reason: HOSPADM

## 2023-01-10 RX ADMIN — HYDROMORPHONE HYDROCHLORIDE 0.5 MG: 1 INJECTION, SOLUTION INTRAMUSCULAR; INTRAVENOUS; SUBCUTANEOUS at 10:04

## 2023-01-10 RX ADMIN — PROPOFOL 25 MG: 10 INJECTION, EMULSION INTRAVENOUS at 08:50

## 2023-01-10 RX ADMIN — Medication 3 MG: at 09:24

## 2023-01-10 RX ADMIN — MIDAZOLAM 2 MG: 1 INJECTION INTRAMUSCULAR; INTRAVENOUS at 07:45

## 2023-01-10 RX ADMIN — FENTANYL CITRATE 100 MCG: 50 INJECTION, SOLUTION INTRAMUSCULAR; INTRAVENOUS at 07:45

## 2023-01-10 RX ADMIN — METHOCARBAMOL 750 MG: 750 TABLET ORAL at 13:24

## 2023-01-10 RX ADMIN — HYDROMORPHONE HYDROCHLORIDE 0.5 MG: 1 INJECTION, SOLUTION INTRAMUSCULAR; INTRAVENOUS; SUBCUTANEOUS at 08:51

## 2023-01-10 RX ADMIN — Medication 2000 MG: at 08:28

## 2023-01-10 RX ADMIN — HYDROMORPHONE HYDROCHLORIDE 0.5 MG: 1 INJECTION, SOLUTION INTRAMUSCULAR; INTRAVENOUS; SUBCUTANEOUS at 10:12

## 2023-01-10 RX ADMIN — HYDROMORPHONE HYDROCHLORIDE 0.5 MG: 1 INJECTION, SOLUTION INTRAMUSCULAR; INTRAVENOUS; SUBCUTANEOUS at 08:48

## 2023-01-10 RX ADMIN — FENTANYL CITRATE 100 MCG: 50 INJECTION, SOLUTION INTRAMUSCULAR; INTRAVENOUS at 08:10

## 2023-01-10 RX ADMIN — PROPOFOL 30 MG: 10 INJECTION, EMULSION INTRAVENOUS at 08:51

## 2023-01-10 RX ADMIN — TRANEXAMIC ACID 1000 MG: 100 INJECTION, SOLUTION INTRAVENOUS at 08:18

## 2023-01-10 RX ADMIN — SODIUM CHLORIDE, SODIUM LACTATE, POTASSIUM CHLORIDE, AND CALCIUM CHLORIDE: 600; 310; 30; 20 INJECTION, SOLUTION INTRAVENOUS at 08:49

## 2023-01-10 RX ADMIN — GLYCOPYRROLATE 0.4 MG: 0.2 INJECTION, SOLUTION INTRAMUSCULAR; INTRAVENOUS at 09:24

## 2023-01-10 RX ADMIN — ROPIVACAINE HYDROCHLORIDE 20 ML: 2 INJECTION, SOLUTION EPIDURAL; INFILTRATION at 07:44

## 2023-01-10 RX ADMIN — Medication 10 MG: at 08:37

## 2023-01-10 RX ADMIN — LIDOCAINE HYDROCHLORIDE 60 MG: 20 INJECTION, SOLUTION EPIDURAL; INFILTRATION; INTRACAUDAL; PERINEURAL at 08:10

## 2023-01-10 RX ADMIN — Medication 10 MG: at 09:20

## 2023-01-10 RX ADMIN — ROCURONIUM BROMIDE 50 MG: 50 INJECTION, SOLUTION INTRAVENOUS at 08:10

## 2023-01-10 RX ADMIN — HYDROMORPHONE HYDROCHLORIDE 0.5 MG: 1 INJECTION, SOLUTION INTRAMUSCULAR; INTRAVENOUS; SUBCUTANEOUS at 09:59

## 2023-01-10 RX ADMIN — PROPOFOL 150 MG: 10 INJECTION, EMULSION INTRAVENOUS at 08:10

## 2023-01-10 RX ADMIN — DEXAMETHASONE SODIUM PHOSPHATE 4 MG: 4 INJECTION, SOLUTION INTRAMUSCULAR; INTRAVENOUS at 07:44

## 2023-01-10 RX ADMIN — OXYCODONE 5 MG: 5 TABLET ORAL at 10:13

## 2023-01-10 RX ADMIN — POLYETHYLENE GLYCOL 3350 17 G: 17 POWDER, FOR SOLUTION ORAL at 13:24

## 2023-01-10 RX ADMIN — SODIUM CHLORIDE, SODIUM LACTATE, POTASSIUM CHLORIDE, AND CALCIUM CHLORIDE: 600; 310; 30; 20 INJECTION, SOLUTION INTRAVENOUS at 06:55

## 2023-01-10 ASSESSMENT — PAIN SCALES - GENERAL
PAINLEVEL_OUTOF10: 7
PAINLEVEL_OUTOF10: 2
PAINLEVEL_OUTOF10: 2
PAINLEVEL_OUTOF10: 0

## 2023-01-10 ASSESSMENT — KOOS JR
GOING UP OR DOWN STAIRS: MODERATE
STANDING UPRIGHT: MODERATE
BENDING TO THE FLOOR TO PICK UP OBJECT: MODERATE
KOOS JR TOTAL INTERVAL SCORE: 52.465
TWISING OR PIVOTING ON KNEE: 2
RISING FROM SITTING: MODERATE
STANDING UPRIGHT: 2
STRAIGHTENING KNEE FULLY: MODERATE
BENDING TO THE FLOOR TO PICK UP OBJECT: 2
TWISING OR PIVOTING ON KNEE: MODERATE
GOING UP OR DOWN STAIRS: 2
HOW SEVERE IS YOUR KNEE STIFFNESS AFTER FIRST WAKING IN MORNING: 2
HOW SEVERE IS YOUR KNEE STIFFNESS AFTER FIRST WAKING IN MORNING: MODERATE
RISING FROM SITTING: 2
STRAIGHTENING KNEE FULLY: 2

## 2023-01-10 ASSESSMENT — PAIN DESCRIPTION - LOCATION
LOCATION: KNEE
LOCATION: KNEE

## 2023-01-10 NOTE — PROGRESS NOTES
ACUTE PHYSICAL THERAPY GOALS:   (Developed with and agreed upon by patient and/or caregiver.)  GOALS (1-4 days):  (1.)Ms. Bardales will move from supine to sit and sit to supine  in bed with INDEPENDENT. (2.)Ms. Bardales will transfer from bed to chair and chair to bed with SUPERVISION using the least restrictive device. (3.)Ms. Bardales will ambulate with SUPERVISION for 300 feet with the least restrictive device. (4.)Ms. Bardales will ambulate up/down 3 steps with right railing with SUPERVISION. (5.)Ms. Bardales will increase left knee ROM to 0-90°.  ________________________________________________________________________________________________            PHYSICAL THERAPY JOINT CAMP: TOTAL KNEE ARTHROPLASTY Initial Assessment and PM  (Link to Caseload Tracking: PT Visit Days : 1  Acknowledge Orders  Time In/Out  PT Charge Capture  Rehab Caseload Tracker  Episode   Cb Russell is a 71 y.o. female   PRIMARY DIAGNOSIS: Degenerative arthritis of left knee  Degenerative arthritis of left knee [M17.12]  Procedure(s) (LRB):  KNEE TOTAL ARTHROPLASTY ROBOTIC SDD (Left)  Day of Surgery  Reason for Referral: Pain in Left Knee (M25.562)  Stiffness of Left Knee, Not elsewhere classified (M25.662)  Difficulty in walking, Not elsewhere classified (R26.2)  Outpatient Surgery: Payor: Kanchan Gould / Plan: [facility] HMO / Product Type: *No Product type* /     REHAB RECOMMENDATIONS:   Recommendation to date pending progress:  Setting:  Home Health Therapy    Equipment:    3 in 1 Bedside Commode  Rolling Walker     RANGE OF MOTION:   Will assess at next session     GAIT: I Mod I S SBA CGA Min Mod Max Total  NT x2 Comments:   Level of Assistance [] [] [] [x] [] [] [] [] [] [] []            Weightbearing Status  Left Lower Extremity Weight Bearing: Weight Bearing As Tolerated    Distance  160 feet    Gait Quality Decreased viktoria  and Decreased step length    DME Rolling Walker     Stairs Stairs/Curb  Stairs?: Yes  Stairs  # Steps : 5  Rails: Right ascending  Device: Single pt cane  Assistance: Stand by assistance;Contact guard assistance    Ramp     I=Independent, Mod I=Modified Independent, S=Supervision, SBA=Standby Assistance, CGA=Contact Guard Assistance,   Min=Minimal Assistance, Mod=Moderate Assistance, Max=Maximal Assistance, Total=Total Assistance, NT=Not Tested    ASSESSMENT:   ASSESSMENT:  Ms. Vitaly Lazo presents s/p L TKA. Patient demonstrates decreased L LE strength and ROM and decreased independence with mobility. She is independent and active at base and would benefit from therapy to address these deficits. Patient will d/c home DOS with assist from her spouse. Patient participated well today. Good awareness of LE exercises. She ambulated well with the RW and negotiated steps well with a rail and a cane. Patient given d/c instructions including use of ice, use of walker, positioning, and HEP. Outcome Measure:   KOOS-JR:  Jr ZANA. Knee Survey Score: 14    SUBJECTIVE:   Ms. Vitaly Lazo agreeable.     Home Environment/Prior Level of Function Lives With: Spouse  Type of Home: House  Home Layout: Two level, Performs ADL's on one level, Able to Live on Main level with bedroom/bathroom  Home Access: Stairs to enter with rails  Entrance Stairs - Number of Steps: 3  Entrance Stairs - Rails: Right  Bathroom Shower/Tub: Tub/Shower unit  Bathroom Toilet: Standard  Home Equipment: Cane, Crutches, Sock aid  ADL Assistance: Independent  Ambulation Assistance: Independent  Active : Yes  Mode of Transportation: Car  Occupation: Retired    OBJECTIVE:     PAIN: VITAL SIGNS: LINES/DRAINS:   Pre Treatment:  Pain Assessment: 0-10  Pain Level: 2      Post Treatment: 2 Vitals        Oxygen    None    RESTRICTIONS/PRECAUTIONS:  Restrictions/Precautions: Weight Bearing, Fall Risk  Left Lower Extremity Weight Bearing: Weight Bearing As Tolerated        Restrictions/Precautions: Weight Bearing, Fall Risk        LOWER EXTREMITY GROSS EVALUATION:  RIGHT LE   Within Functional Limits   Abnormal   Comments   Strength [x] []     Range of Motion [x] []        LEFT LE Within Functional Limits   Abnormal   Comments   Strength [] []  2+/3-   Range of Motion [] []  S/p TKA     UPPER EXTREMITY GROSS EVALUATION:     Within Functional Limits   Abnormal   Comments   Strength [x] []    Range of Motion [x] []      SENSATION  Sensation [x]       COGNITION/  PERCEPTION: Intact Impaired (Comments):   Orientation [x]     Vision [x]     Hearing [x]     Cognition  [x]       MOBILITY: I Mod I S SBA CGA Min Mod Max Total  NT x2 Comments:   Bed Mobility    Rolling [] [] [] [] [] [] [] [] [] [] []    Supine to Sit [] [] [] [] [] [] [] [] [] [] []    Scooting [] [] [] [] [] [] [] [] [] [] []    Sit to Supine [] [] [] [] [] [] [] [] [] [] []    Transfers    Sit to Stand [] [] [] [x] [] [] [] [] [] [] []    Bed to Chair [] [] [] [x] [] [] [] [] [] [] []    Stand to Sit [] [] [] [x] [] [] [] [] [] [] []    Stand Pivot [] [] [] [] [] [] [] [] [] [] []    Toilet [] [] [] [x] [] [] [] [] [] [] []     [] [] [] [] [] [] [] [] [] [] []    I=Independent, Mod I=Modified Independent, S=Supervision, SBA=Standby Assistance, CGA=Contact Guard Assistance,   Min=Minimal Assistance, Mod=Moderate Assistance, Max=Maximal Assistance, Total=Total Assistance, NT=Not Tested    BALANCE: Good Fair+ Fair Fair- Poor NT Comments   Sitting Static [x] [] [] [] [] []    Sitting Dynamic [x] [] [] [] [] []              Standing Static [] [x] [] [] [] []    Standing Dynamic [] [x] [] [] [] []      PLAN:   FREQUENCY AND DURATION: BID for duration of hospital stay or until stated goals are met, whichever comes first.    THERAPY PROGNOSIS: Good    PROBLEM LIST:   (Skilled intervention is medically necessary to address:)  Decreased ADL/Functional Activities, Decreased Activity Tolerance, Decreased AROM/PROM, Decreased Gait Ability, Decreased Strength, Decreased Transfer Abilities, and Increased Pain INTERVENTIONS PLANNED:   (Benefits and precautions of physical therapy have been discussed with the patient.)  Therapeutic Activity, Therapeutic Exercise/HEP, Gait Training, Modalities, and Education       TREATMENT:   EVALUATION: LOW COMPLEXITY: (Untimed Charge)    TREATMENT:   Therapeutic Activity (40 Minutes): Therapeutic activity included Transfer Training, Ambulation on level ground, Stair Training, Sitting balance , Standing balance, and exercises as below and d/c education to improve functional Activity tolerance, Balance, Coordination, Mobility, Strength, and ROM. TREATMENT GRID:  THERAPEUTIC  EXERCISES: DATE:  1/10 DATE:   DATE:      AM PM AM PM AM PM    [] [] [] [] [] []   Ankle Pumps  10       Quad Sets  10       Gluteal Sets  10       Hip Abd/ADduction  10       Straight Leg Raises  10       Knee Slides  10       Short Arc Quads  10       Chair Slides  10                         B = bilateral; AA = active assistive; A = active; P = passive      EDUCATION: Education Given To: Patient  Education Provided: Role of Therapy, Plan of Care, Home Exercise Program, Precautions, Transfer Training, Equipment, Fall Prevention Strategies  Education Method: Verbal, Printed Information/Hand-outs  Education Outcome: Verbalized understanding  EDUCATION:  [x] Home Exercises  [x] Fall Precautions  [x] No Pillow Under Knee  [x] D/C Instruction Review [] Cryocuff  [x] Walker Management/Safety  [] Adaptive Equipment as Needed     AFTER TREATMENT PRECAUTIONS: Bed/Chair Locked, Call light within reach, Chair, Needs within reach, RN notified, and Visitors at bedside    INTERDISCIPLINARY COLLABORATION:  RN/ PCT, OT/ PICKARD, and RN Case Manager/      COMPLIANCE WITH PROGRAM/EXERCISE: Will assess as treatment progresses. RECOMMENDATIONS/INTENT FOR NEXT TREATMENT SESSION: Treatment next visit will focus on increasing Ms. Bardales's independence with bed mobility, transfers, gait training, strength/ROM exercises, modalities for pain, and patient education.      TIME IN/OUT:  Time In: 1400  Time Out: [facility] [facility]  Minutes: [address] [facility] Vin Aguilar

## 2023-01-10 NOTE — ANESTHESIA POSTPROCEDURE EVALUATION
Department of Anesthesiology  Postprocedure Note    Patient: Mariely Reynolds  MRN: [id number]  YOB: 1950  Date of evaluation: 1/10/2023      Procedure Summary     Date: 01/10/23 Room / Location: [facility] MAIN OR 06 / [facility] MAIN OR    Anesthesia Start: 8693 Anesthesia Stop: 2323    Procedure: KNEE TOTAL ARTHROPLASTY ROBOTIC SDD (Left: Knee) Diagnosis:       Degenerative arthritis of left knee      (Degenerative arthritis of left knee [M17.12])    Surgeons: Yesika Mcdonald MD Responsible Provider: Maurice Devlin MD    Anesthesia Type: general ASA Status: 2          Anesthesia Type: No value filed.     Miguelito Phase I: Miguelito Score: 8    Miguelito Phase II:        Anesthesia Post Evaluation    Patient location during evaluation: PACU  Patient participation: complete - patient participated  Level of consciousness: awake and alert  Airway patency: patent  Nausea & Vomiting: no nausea and no vomiting  Complications: no  Cardiovascular status: hemodynamically stable  Respiratory status: acceptable, nonlabored ventilation and spontaneous ventilation  Hydration status: euvolemic  Comments: BP (!) 120/56   Pulse 82   Temp 97.7 °F (36.5 °C) (Axillary)   Resp 16   Wt 158 lb (71.7 kg)   SpO2 96%   BMI 29.85 kg/m²     Multimodal analgesia pain management approach

## 2023-01-10 NOTE — PROGRESS NOTES
Pre-Surgery Prayer. PT was in bed preparing for surgery. Spouse was at bedside. [address] introduced self. PT expressed importance of liana and prayer. PT expressed PT that PT is expecting her pastors to also come and pray. PT is [place]. CH offered prayer.  offered support if needed for PT, PT's Family, and PT's Pastors. Rev. Galindo Irizarry M.Div.

## 2023-01-10 NOTE — ANESTHESIA PROCEDURE NOTES
Peripheral Block    Patient location during procedure: pre-op  Reason for block: post-op pain management and at surgeon's request  Start time: 1/10/2023 7:44 AM  End time: 1/10/2023 7:47 AM  Staffing  Performed: anesthesiologist   Anesthesiologist: Jesse Tadeo MD  Preanesthetic Checklist  Completed: patient identified, IV checked, site marked, risks and benefits discussed, surgical/procedural consents, equipment checked, pre-op evaluation, timeout performed, anesthesia consent given, oxygen available and monitors applied/VS acknowledged  Peripheral Block   Patient position: supine  Prep: ChloraPrep  Provider prep: mask and sterile gloves  Patient monitoring: cardiac monitor, continuous pulse ox, frequent blood pressure checks, IV access, oxygen and responsive to questions  Block type: Femoral  Adductor canal  Laterality: left  Injection technique: single-shot  Guidance: ultrasound guided    Needle   Needle type: insulated echogenic nerve stimulator needle   Needle gauge: 20 G  Needle localization: ultrasound guidance  Needle length: 10 cm  Assessment   Injection assessment: negative aspiration for heme, no paresthesia on injection, no intravascular symptoms and local visualized surrounding nerve on ultrasound  Slow fractionated injection: yes  Hemodynamics: stable  Real-time US image taken/store: yes  Outcomes: patient tolerated procedure well and uncomplicated    Additional Notes  3 cc 1% lidocaine local at needle insertion site. Risks discussed including damage to muscle or nerve. Needle inserted and placed in close proximity to the nerve under real time ultrasound guidance. Ultrasound was used to visualize the spread of local anesthetic in close proximity to the nerve being blocked. All structures appeared anatomically normal and there were no abnormal findings. Ultrasound imaged printed and placed on chart.     Medications Administered  dexamethasone 4 MG/ML - Perineural   4 mg - 1/10/2023 7:44:00 AM  EPINEPHrine PF 1 MG/ML Soln, ropivacaine 0.2% Soln (Mixture components: EPINEPHrine PF 1 MG/ML Soln, 0.005 mL; ropivacaine 0.2% Soln, 0.1 mL) - Perineural   20 mL - 1/10/2023 7:44:00 AM

## 2023-01-10 NOTE — CARE COORDINATION
01/10/23 1216   Service Assessment   Patient Orientation Alert and Oriented   History Provided By Patient   Primary Caregiver Self   PCP Verified by CM Yes  Sherin Raygoza)   Prior Functional Level Independent in ADLs/IADLs   Current Functional Level Independent in ADLs/IADLs   Can patient return to prior living arrangement Yes   Family able to assist with home care needs: Yes   Would you like for me to discuss the discharge plan with any other family members/significant others, and if so, who? No     Patient admitted for left TKA, and she plans to discharge today. SW met with patient and  (Shannon Tanner, [phone]). Patient states that she already has a rolling walker at home; however, she is in need of a bedside commode. SW provided bedside commode to patient thru [facility]    Patient verbalized a preference for [facility] - referral made. No additional needs identified. SW will continue to follow.     HERIBERTO Graham, [address]

## 2023-01-10 NOTE — H&P
The patient has end stage arthritis of the left knee. The patient was see and examined and there are no changes to the patient's orthopedic condition. They have tried conservative treatment for this condition; including antiinflammatories and lifestyle modifications and have failed. The necessity for the joint replacement is still present, and the H&P from the office is still current. The patient is admitted today forProcedure(s) (LRB):  KNEE TOTAL ARTHROPLASTY ROBOTIC SDD (Left) .

## 2023-01-10 NOTE — ANESTHESIA PRE PROCEDURE
Department of Anesthesiology  Preprocedure Note       Name:  Leyla Morrison   Age:  71 y.o.  :  1950                                          MRN:  [id number]         Date:  1/10/2023      Surgeon: Rosalie Myers):  Matthew Salgado MD    Procedure: Procedure(s):  KNEE TOTAL ARTHROPLASTY ROBOTIC SDD    Medications prior to admission:   Prior to Admission medications    Medication Sig Start Date End Date Taking? Authorizing Provider   acetaminophen (TYLENOL) 500 MG tablet Take 1,000 mg by mouth every 6 hours as needed for Pain   Yes Historical Provider, MD   traMADol (ULTRAM) 50 MG tablet Take 50 mg by mouth 2 times daily as needed for Pain. Historical Provider, MD   vitamin C (ASCORBIC ACID) 500 MG tablet Take 1,000 mg by mouth daily    Historical Provider, MD   vitamin E 400 UNIT capsule Take 400 Units by mouth daily    Historical Provider, MD   Omega-3 Fatty Acids (FISH OIL) 1200 MG CAPS Take 1,200 mg by mouth in the morning and at bedtime    Historical Provider, MD   atorvastatin (LIPITOR) 20 MG tablet Take 1 tablet by mouth daily 22   Ene Nixon DO   DULoxetine (CYMBALTA) 30 MG extended release capsule Take 1 capsule by mouth daily 22   Ene Nixon DO   DULoxetine (CYMBALTA) 60 MG extended release capsule Take 1 capsule by mouth daily 8/19/22 1/3/23  Ene Nixon DO   furosemide (LASIX) 40 MG tablet Take 1 tablet by mouth daily TAKE 1 TABLET EVERY DAY 22   Ene Nixon DO   lansoprazole (PREVACID) 30 MG delayed release capsule Take 1 capsule by mouth daily TAKE 1 CAPSULE DAILY (BEFORE BREAKFAST).  22   Ene Nixon DO   Cholecalciferol 50 MCG (2000 UT) CAPS Take 5,000 Units by mouth    Ar Automatic Reconciliation       Current medications:    Current Facility-Administered Medications   Medication Dose Route Frequency Provider Last Rate Last Admin    lidocaine 1 % injection 1 mL  1 mL IntraDERmal Once PRN Karen Watkins MD        fentaNYL (SUBLIMAZE) injection 100 mcg  100 mcg IntraVENous Once PRN Chyna Mcelroy MD        lactated ringers infusion   IntraVENous Continuous Chyna Mcelroy  mL/hr at 01/10/23 0655 New Bag at 01/10/23 0655    sodium chloride flush 0.9 % injection 5-40 mL  5-40 mL IntraVENous 2 times per day Chyna Mcelroy MD        sodium chloride flush 0.9 % injection 5-40 mL  5-40 mL IntraVENous PRN Chyna Mcelroy MD        0.9 % sodium chloride infusion   IntraVENous PRN Chyna Mcelroy MD        midazolam PF (VERSED) injection 2 mg  2 mg IntraVENous Once PRN Chyna Mcelroy MD        sodium chloride flush 0.9 % injection 5-40 mL  5-40 mL IntraVENous 2 times per day ORLANDO Shafer        sodium chloride flush 0.9 % injection 5-40 mL  5-40 mL IntraVENous PRN ORLANDO Shafer        0.9 % sodium chloride infusion   IntraVENous PRN ORLANDO Shafer        ceFAZolin (ANCEF) 2000 mg in sterile water 20 mL IV syringe  2,000 mg IntraVENous On Call to [address] [place] [address]        ketorolac (TORADOL) injection    PRN Zaida Hernadez MD   30 mg at 01/10/23 0708    ropivacaine (NAROPIN) 0.2% injection 0.2%    PRN Zaida Hernadez MD   60 mL at 01/10/23 0709    methocarbamol (ROBAXIN) tablet 750 mg  750 mg Oral 4x Daily PRN ORLANDO Shafer        tranexamic acid (CYKLOKAPRON) 1,000 mg, sodium chloride 0.9 % 30 mL    PRN Zaida Hernadez MD   40 mL at 01/10/23 0709    vancomycin (VANCOCIN) injection    PRN Zaida Hernadez MD   1,000 mg at 01/10/23 0710       Allergies:     Allergies   Allergen Reactions    Sulfa Antibiotics Nausea And Vomiting and Rash       Problem List:    Patient Active Problem List   Diagnosis Code    Lumbosacral radiculopathy M54.17    COVID-19 vaccination refused Z28.21    FIDENCIO I (cervical intraepithelial neoplasia I) N87.0    Stopped smoking with greater than 30 pack year history Z87.891    Low grade squamous intraepithelial lesion (LGSIL) on cervical Pap smear R87.612    Bilateral carpal tunnel syndrome G56.03    Benign essential HTN I10    Spinal stenosis at L4-L5 level M48.061    Mammogram declined Z53.20    Generalized anxiety disorder with panic attacks F41.1, F41.0    Vitamin D deficiency E55.9    Gastroesophageal reflux disease without esophagitis K21.9    Hypercholesterolemia E78.00    IGT (impaired glucose tolerance) R73.02    Hyperkalemia E87.5    High risk medication use Z79.899    Class 1 obesity due to excess calories without serious comorbidity with body mass index (BMI) of 30.0 to 30.9 in adult E66.09, Z68.30    Alopecia L65.9    Mild episode of recurrent major depressive disorder (HCC) F33.0    Cervical stenosis (uterine cervix) N88.2    Degenerative arthritis of left knee M17.12    Colonoscopy refused Z53.20       Past Medical History:        Diagnosis Date    Abnormal Papanicolaou smear of cervix     Arthritis 3/24/2014    Bulging lumbar disc     Depressive disorder 3/24/2014    DVT (deep venous thrombosis) (HCC) [place]    Former smoker     GERD (gastroesophageal reflux disease) 3/24/2014    on med for control    Heartburn     HTN (hypertension) 8/9/2013    HX; no current treatment    Hypercholesterolemia 3/24/2014    on med for control    LGSIL on Pap smear of cervix     HX of LGSIL    Menopause     AGE 44    PUD (peptic ulcer disease)     Spinal stenosis     Spinal stenosis at L4-L5 level 9/29/2017    Followed by PEDRO Clement    Vitamin D deficiency 3/24/2014       Past Surgical History:        Procedure Laterality Date    CHOLECYSTECTOMY      COLPOSCOPY  01/25/2013    times two    LAP,TUBAL CAUTERY      TONSILLECTOMY         Social History:    Social History     Tobacco Use    Smoking status: Former     Packs/day: 1.50     Years: 35.00     Pack years: 52.50     Types: Cigarettes     Quit date: 10/1/2012     Years since quitting: 10.2    Smokeless tobacco: Former   Substance Use Topics    Alcohol use: No     Alcohol/week: 0.0 standard drinks                                Counseling given: Not Answered      Vital Signs (Current):   Vitals:    01/10/23 0600 01/10/23 0725   BP: (!) 140/79 (!) 155/72   Pulse: 79 70   Resp: 18 18   Temp: 97.7 °F (36.5 °C)    TempSrc: Temporal    SpO2: 92% 99%   Weight: 158 lb (71.7 kg)                                               BP Readings from Last 3 Encounters:   01/10/23 (!) 155/72   01/03/23 133/68   12/06/22 128/66       NPO Status:                                                   Date of last liquid consumption: 01/09/23                        Date of last solid food consumption: 01/09/23    BMI:   Wt Readings from Last 3 Encounters:   01/10/23 158 lb (71.7 kg)   01/03/23 154 lb 5.2 oz (70 kg)   12/06/22 157 lb (71.2 kg)     Body mass index is 29.85 kg/m². CBC:   Lab Results   Component Value Date/Time    WBC 7.1 01/03/2023 02:02 PM    RBC 4.23 01/03/2023 02:02 PM    HGB 14.2 01/03/2023 02:02 PM    HCT 42.7 01/03/2023 02:02 PM    .9 01/03/2023 02:02 PM    RDW 12.8 01/03/2023 02:02 PM     01/03/2023 02:02 PM       CMP:   Lab Results   Component Value Date/Time     01/03/2023 02:02 PM    K 3.7 01/03/2023 02:02 PM     01/03/2023 02:02 PM    CO2 31 01/03/2023 02:02 PM    BUN 14 01/03/2023 02:02 PM    CREATININE 0.78 01/03/2023 02:02 PM    GFRAA 79 12/02/2021 08:39 AM    AGRATIO 1.8 12/02/2021 08:39 AM    LABGLOM >60 01/03/2023 02:02 PM    GLUCOSE 100 01/03/2023 02:02 PM    PROT 6.7 12/02/2022 08:07 AM    CALCIUM 9.4 01/03/2023 02:02 PM    BILITOT 0.5 12/02/2022 08:07 AM    ALKPHOS 72 12/02/2022 08:07 AM    ALKPHOS 71 12/02/2021 08:39 AM    AST 19 12/02/2022 08:07 AM    ALT 26 12/02/2022 08:07 AM       POC Tests: No results for input(s): POCGLU, POCNA, POCK, POCCL, POCBUN, POCHEMO, POCHCT in the last 72 hours.     Coags:   Lab Results   Component Value Date/Time    PROTIME 13.1 01/03/2023 02:02 PM    INR 1.0 01/03/2023 02:02 PM    APTT 26.9 01/03/2023 02:02 PM       HCG (If Applicable): No results found for: PREGTESTUR, PREGSERUM, HCG, HCGQUANT     ABGs: No results found for: PHART, PO2ART, ZWE0HER, VDL7RRQ, BEART, H6ZVNCKR     Type & Screen (If Applicable):  No results found for: LABABO, LABRH    Drug/Infectious Status (If Applicable):  Lab Results   Component Value Date/Time    HEPCAB <0.1 10/05/2016 09:16 AM       COVID-19 Screening (If Applicable): No results found for: COVID19        Anesthesia Evaluation    Airway: Mallampati: II  TM distance: >3 FB   Neck ROM: full  Mouth opening: > = 3 FB   Dental: normal exam   (+) upper dentures      Pulmonary:normal exam  breath sounds clear to auscultation                             Cardiovascular:  Exercise tolerance: good (>4 METS),   (+) hypertension:,         Rhythm: regular  Rate: normal                    Neuro/Psych:   (+) depression/anxiety              ROS comment: Lumbar spinal stenosis GI/Hepatic/Renal:   (+) GERD: well controlled,           Endo/Other:    (+) : arthritis:., .                 Abdominal:             Vascular: Other Findings:           Anesthesia Plan      general     ASA 2     (Pt with h/o spinal stenosis and LBP, receives frequent ESIs, prefers GETA. )  Induction: intravenous. Anesthetic plan and risks discussed with patient.               Post-op pain plan if not by surgeon: single peripheral nerve block            Evans Sims MD   1/10/2023

## 2023-01-10 NOTE — PROGRESS NOTES
OCCUPATIONAL THERAPY Initial Assessment, Daily Note, and PM      (Link to Caseload Tracking: OT Visit Days: 1  OT Orders   Time  OT Charge Capture  Rehab Caseload Tracker  Episode     Ernie Read is a 71 y.o. female   PRIMARY DIAGNOSIS: Degenerative arthritis of left knee  Degenerative arthritis of left knee [M17.12]  Procedure(s) (LRB):  KNEE TOTAL ARTHROPLASTY ROBOTIC SDD (Left)  Day of Surgery  Reason for Referral: Pain in Left Knee (M25.562)  Stiffness of Left Knee, Not elsewhere classified (L34.641)  Outpatient Surgery: Payor: [employer] / Plan: [employer] G[employer] PLUS HMO / Product Type: *No Product type* /     ASSESSMENT:     REHAB RECOMMENDATIONS:   Recommendation to date pending progress:  Setting:  Home Health Therapy    Equipment:    3 in 1 Bedside Commode  Rolling Walker (jr walker)     ASSESSMENT:  Ms. Sotero Parra is s/p left TKA and presents with decreased independence with functional mobility and activities of daily living as compared to baseline level of function and safety. Patient would benefit from skilled Occupational Therapy to maximize independence and safety with self-care task and functional mobility. Patient able to complete  lower body dressing, upper body dressing, toileting, and grooming at bathroom with contact guard assist .  Mobilized from hospital bed to bathroom to recliner chair using a rolling walker with assist. Patient is hopeful to return home day of surgery. She plans have support from spouse and should progress well with continued therapy once home.         [employer] AM-PAC 6 Clicks Daily Activity Inpatient Short Form:    AM-PAC Daily Activity Inpatient   How much help for putting on and taking off regular lower body clothing?: A Little  How much help for Bathing?: A Little  How much help for Toileting?: A Little  How much help for putting on and taking off regular upper body clothing?: None  How much help for taking care of personal grooming?: None  How much help for eating meals?: None  AM-PAC Inpatient Daily Activity Raw Score: 21  AM-PAC Inpatient ADL T-Scale Score : 44.27  ADL Inpatient CMS 0-100% Score: 32.79  ADL Inpatient CMS G-Code Modifier : CJ     SUBJECTIVE:     Ms. Nilesh Mauricio states, \"I might make y'all some peanut butter cookies\"      Social/Functional   Lives With: Spouse  Type of Home: House  Home Layout: Two level, Performs ADL's on one level, Able to Live on Main level with bedroom/bathroom  Home Access: Stairs to enter with rails  Entrance Stairs - Number of Steps: 3  Entrance Stairs - Rails: Right  Bathroom Shower/Tub: Tub/Shower unit  Bathroom Toilet: Standard  Home Equipment: Delcie Hill, Sock aid  ADL Assistance: Independent  Ambulation Assistance: Independent  Active : Yes  Mode of Transportation: Car  Occupation: Retired    OBJECTIVE:     Glynn Millan / Mane Gonzalez / Yumiko Quarry: None    RESTRICTIONS/PRECAUTIONS:  Restrictions/Precautions: Weight Bearing, Fall Risk  Left Lower Extremity Weight Bearing: Weight Bearing As Tolerated    PAIN: VITALS / O2:   Pre Treatment:          Post Treatment: no c/o pain during session Vitals          Oxygen        GROSS EVALUATION: INTACT IMPAIRED   (See Comments)   UE AROM [x] []   UE PROM [x] []   Strength [x]       Posture / Balance []  Cues for safe use of RW during mobility and daily activities    Sensation [x]     Coordination [x]       Tone []       Edema []    Activity Tolerance []  Decreased from baseline     Hand Dominance R [] L []      COGNITION/  PERCEPTION: INTACT IMPAIRED   (See Comments)   Orientation [x]     Vision [x]     Hearing [x]     Cognition  [x]     Perception [x]       MOBILITY: I Mod I S SBA CGA Min Mod Max Total  NT x2 Comments:   Bed Mobility    Rolling [] [] [] [] [] [] [] [] [] [] []    Supine to Sit [] [] [] [] [x] [] [] [] [] [] []    Scooting [] [] [] [] [x] [] [] [] [] [] []    Sit to Supine [] [] [] [] [] [] [] [] [] [] []    Transfers    Sit to Stand [] [] [] [x] [] [] [] [] [] [] []    Bed to Chair [] [] [] [x] [] [] [] [] [] [] []    Stand to Sit [] [] [] [x] [] [] [] [] [] [] []    Tub/Shower [] [] [] [] [] [] [] [] [] [] []       Toilet [] [] [] [] [x] [] [] [] [] [] []  Standard commode      [] [] [] [] [] [] [] [] [] [] []    I=Independent, Mod I=Modified Independent, S=Supervision/Setup, SBA=Standby Assistance, CGA=Contact Guard Assistance, Min=Minimal Assistance, Mod=Moderate Assistance, Max=Maximal Assistance, Total=Total Assistance, NT=Not Tested    ACTIVITIES OF DAILY LIVING: I Mod I S SBA CGA Min Mod Max Total NT Comments   BASIC ADLs:              Upper Body Bathing [] [] [] [] [] [] [] [] [] []    Lower Body Bathing [] [] [] [] [] [] [] [] [] []    Toileting [] [] [] [] [x] [] [] [] [] []    Upper Body Dressing [] [] [x] [] [] [] [] [] [] []    Lower Body Dressing [] [] [] [] [x] [] [] [] [] [] Underwear, pants, socks and shoes   Feeding [] [] [] [] [] [] [] [] [] []    Grooming [] [] [] [x] [] [] [] [] [] [] Standing at sink   Personal [facility] [] [] [] [] [] [] [] [] [] []    Functional Mobility [] [] [] [] [x] [] [] [] [] [] RW   I=Independent, Mod I=Modified Independent, S=Supervision/Setup, SBA=Standby Assistance, CGA=Contact Guard Assistance, Min=Minimal Assistance, Mod=Moderate Assistance, Max=Maximal Assistance, Total=Total Assistance, NT=Not Tested    PLAN:     FREQUENCY/DURATION     for duration of hospital stay or until stated goals are met, whichever comes first.    ACUTE OCCUPATIONAL THERAPY GOALS:   (Developed with and agreed upon by patient and/or caregiver.)    GOALS:   DISCHARGE GOALS (in preparation for going home/rehab):  3 days  1. Ms. Cookie Bermudez will perform lower body dressing activity with contact guard assist required to demonstrate improved functional mobility and safety. 2.  Ms. Cookie Bermudez will perform bathing activity with contact guard assist required to demonstrate improved functional mobility and safety.   3.  Ms. Cookie Bermudez will perform toileting activity with  contact guard assist to demonstrate improved functional mobility and safety. 4.  Ms. Jazlyn Cook will perform all functional transfers transfer with contact guard assist to demonstrate improved functional mobility and safety. PROBLEM LIST:   (Skilled intervention is medically necessary to address:)  Decreased ADL/Functional Activities  Decreased Activity Tolerance  Decreased Balance  Decreased Coordination  Decreased Gait Ability  Decreased Safety Awareness  Decreased Transfer Abilities   INTERVENTIONS PLANNED:   (Benefits and precautions of occupational therapy have been discussed with the patient.)  Self Care Training  Therapeutic Activity  Therapeutic Exercise/HEP  Neuromuscular Re-education  Manual Therapy  Education         TREATMENT:     EVALUATION: LOW COMPLEXITY: (Untimed Charge)    TREATMENT:   Co-Treatment PT/OT necessary due to patient's decreased overall endurance/tolerance levels, as well as need for high level skilled assistance to complete functional transfers/mobility and functional tasks  Self Care (25 minutes): Patient participated in toileting, upper body dressing, lower body dressing, personal device care, and grooming ADLs in unsupported sitting and standing with minimal verbal, manual, and tactile cueing to increase independence, increase activity tolerance, and increase safety awareness. Patient also participated in functional mobility, functional transfer, and adaptive equipment training to increase independence, increase activity tolerance, and increase safety awareness.      AFTER TREATMENT PRECAUTIONS: Bed/Chair Locked, Call light within reach, Chair, Needs within reach, RN notified, and Visitors at bedside    INTERDISCIPLINARY COLLABORATION:  RN/ PCT and PT/ PTA    EDUCATION:  Education Given To: Patient, Family  Education Provided: Role of Therapy, Plan of Care, Precautions, ADL Adaptive Strategies, Transfer Training, Energy Conservation, Equipment, Fall Prevention Strategies  Education Method: Demonstration, Verbal  Barriers to Learning: None  Education Outcome: Verbalized understanding, Demonstrated understanding, Continued education needed  [x] Safe And Effective Hygiene  [x] Fall Precautions  [] Hip Precautions  [x] D/C Instruction Review [] Prosthesis Review  [x] Walker Management/Safety  [x] Adaptive Equipment as Needed  [x] Therapeutic Resting Position of Joint       TOTAL TREATMENT DURATION AND TIME:  Time In: 7650  Time Out: 6549  Minutes: [address], [place]

## 2023-01-10 NOTE — PROGRESS NOTES
Pt upper denture plate removed prior to moving out of pre op/block area. Placed in denture cup with pt name label placed on cup.

## 2023-01-10 NOTE — PERIOP NOTE
MD desouza at bedside with patient. Pt VSS stable. Pain and Nausea controlled at this time. Verbal sign out per MD when pacu cares completed. Plan of care continues.

## 2023-01-10 NOTE — PROGRESS NOTES
Discharge orders were reviewed. Patient has no questions about instructions, medications, or where to  new medications. Patient was taken downstairs in wheel chair for discharge.

## 2023-01-10 NOTE — CARE COORDINATION
PT recommending a jr walker as patient's purchased walker is too tall. [place] Owens arranged with [facility] and delivered to to pt prior to d/c. Update order faxed to [facility].

## 2023-01-10 NOTE — DISCHARGE INSTRUCTIONS
[facility]      Patient Discharge Instructions    Katherine Paula / [id number] : 1950    Admitted 1/10/2023 Discharged: 1/10/2023     IF YOU HAVE ANY PROBLEMS ONCE YOU ARE AT HOME CALL THE FOLLOWING NUMBERS:   Main office number: [phone]      Medications    The medications you are to continue on are listed on the medication reconciliation sheet. Narcotic pain medications as well as supplemental iron can cause constipation. If this occurs try stopping the narcotic pain medication and/or the iron. It is important that you take the medication exactly as they are prescribed. Medications which increase your risk of blood clots are listed to stop for 5 weeks after surgery as well as medications or supplements which increase your risk of bleeding complications. Keep your medication in the bottles provided by the pharmacist and keep a list of the medication names, dosages, and times to be taken in your wallet. Do not take other medications without consulting your doctor. Important Information    Do NOT smoke as this will greatly increase your risk of infection! Resume your prehospital diet. If you have excessive nausea or vomitting call your doctor's office     Leg swelling and warmth is normal for 6 months after surgery. If you experience swelling in your leg elevate you leg while laying down with your toes above your heart. If you have sudden onset severe swelling with leg pain call our office. The stitches deep inside take approximately 6 months to dissolve. There will be sharp shooting, stinging and burning pain. This is normal and will resolve between 3-6 months after surgery. Difficulty sleeping is normal following total Knee and Hip replacement. You may try melatonin, an over-the-counter sleep aid or benadryl to help with sleep. Most patients will resume sleeping through the night 8 weeks after surgery. Home Physical Therapy is arranged.  Home Health will contact you within 48 hrs of discharge that you have chosen. If you have not received a call within this time frame please contact that provider you chose. You should be given this information before you leave the hospital.     You are at a risk for falls. Use the rolling walker when walking. Patients who have had a joint replacement should not drive if they are still taking narcotic pain mediation during the daytime hours. Most patients wean themselves off of pain medication within 2-5 weeks after surgery. When to Call the office    - If you have a temperature greater then 101  - Uncontrolled vomiting   - Loose control of your bladder or bowel function  - Are unable to bear any wieght   - Need a pain medication refill          Total Knee Replacement: What to Expect at  [address] had a total knee replacement. The doctor replaced the worn ends of the bones that connect to your knee (thighbone and lower leg bone) with plastic and metal parts. When you leave the hospital, you should be able to move around with a walker or crutches. But you will need someone to help you at home until you have more energy and can move around better. You will go home with a bandage and stitches, staples, skin glue, or tape strips. Change the bandage as your doctor tells you to. If you have stitches or staples, your doctor will remove them about 2 weeks after your surgery. Glue or tape strips will fall off on their own over time. You may still have some mild pain, and the area may be swollen for a few months after surgery. Your knee will continue to improve for up to a year. You will probably use a walker for some time after surgery. When you are ready, you can use a cane. You may be able to walk without support after a couple weeks, or when you are comfortable. You will need to do months of physical rehabilitation (rehab) after a knee replacement.  Rehab will help you strengthen the muscles of the knee and help you regain movement. After you recover, your artificial knee will allow you to do normal daily activities with less pain or no pain at all. You may be able to hike, dance, or ride a bike. Talk to your doctor about whether you can do more strenuous activities. Always tell your caregivers that you have an artificial knee. How long it will take to walk on your own, return to normal activities, and go back to work depends on your health and how well your rehabilitation (rehab) program goes. The better you do with your rehab exercises, the quicker you will get your strength and movement back. This care sheet gives you a general idea about how long it will take for you to recover. But each person recovers at a different pace. Follow the steps below to get better as quickly as possible. How can you care for yourself at home? Activity    Rest when you feel tired. You may take a nap, but don't stay in bed all day. When you sit, use a chair with arms. You can use the arms to help you stand up. Work with your physical therapist to find the best way to exercise. What you can do as your knee heals will depend on whether your new knee is cemented or uncemented. You may not be able to do certain things for a while if your new knee is uncemented. After your knee has healed enough, you can do more strenuous activities with caution. You can golf, but you may want to use a golf cart for some time. And don't wear shoes with spikes. You can bike on a flat road or on a stationary bike. Talk to your doctor before biking uphill. Your doctor may suggest that you stay away from activities that put stress on your knee. These include tennis, badminton, contact sports like football, jumping (such as in basketball), jogging, and running. Avoid activities where you might fall. Do not sit for more than 1 hour at a time. Get up and walk around for a while before you sit again.  If you must sit for a long time, prop up your leg with a chair or footstool. This will help you avoid swelling. Ask your doctor when you can drive again. It may take several weeks after knee replacement surgery before it's safe for you to drive. When you get into a car, sit on the edge of the seat. Then pull in your legs, and turn to face the front. You should be able to do many everyday activities 3 to 6 weeks after your surgery. You will probably need to take 4 to 16 weeks off from work. When you can go back to work depends on the type of work you do and how you feel. Ask your doctor when it is okay for you to have sex. For 12 weeks, do not lift anything heavier than 10 pounds and do not lift weights. Diet    By the time you leave the hospital, you should be eating your normal diet. If your stomach is upset, try bland, low-fat foods like plain rice, broiled chicken, toast, and yogurt. Your doctor may suggest that you take iron and vitamin supplements. Drink plenty of fluids (unless your doctor tells you not to). Eat healthy foods, and watch your portion sizes. Try to stay at your ideal weight. Too much weight puts more stress on your new knee. You may notice that your bowel movements are not regular right after your surgery. This is common. Try to avoid constipation and straining with bowel movements. Drinking enough fluids, taking a stool softener, and eating foods that are good sources of fiber can help you avoid constipation. If you have not had a bowel movement after a couple of days, talk to your doctor. Medicines    Your doctor will tell you if and when you can restart your medicines. You will also get instructions about taking any new medicines. If you stopped taking aspirin or some other blood thinner, your doctor will tell you when to start taking it again. Your doctor may give you a blood-thinning medicine to prevent blood clots.  If you take a blood thinner, be sure you get instructions about how to take your medicine safely. Blood thinners can cause serious bleeding problems. This medicine could be in pill form or as a shot (injection). If a shot is needed, your doctor will tell you how to do this. Be safe with medicines. Take pain medicines exactly as directed. If the doctor gave you a prescription medicine for pain, take it as prescribed. If you are not taking a prescription pain medicine, ask your doctor if you can take an over-the-counter medicine. Plan to take your pain medicine 30 minutes before exercises. It is easier to prevent pain before it starts than to stop it after it has started. If you think your pain medicine is making you sick to your stomach: Take your medicine after meals (unless your doctor has told you not to). Ask your doctor for a different pain medicine. If your doctor prescribed antibiotics, take them as directed. Do not stop taking them just because you feel better. You need to take the full course of antibiotics. Incision care    If your doctor told you how to care for your cut (incision), follow your doctor's instructions. You will have a dressing over the cut. A dressing helps the incision heal and protects it. Your doctor will tell you how to take care of this. If you did not get instructions, follow this general advice: If you have strips of tape on the cut the doctor made, leave the tape on for a week or until it falls off. If you have stitches or staples, your doctor will tell you when to come back to have them removed. If you have skin glue on the cut, leave it on until it falls off. Skin glue is also called skin adhesive or liquid stitches. Change the bandage every day. Wash the area daily with warm water, and pat it dry. Don't use hydrogen peroxide or alcohol. They can slow healing. You may cover the area with a gauze bandage if it oozes fluid or rubs against clothing. You may shower 24 to 48 hours after surgery. Pat the incision dry.  Don't swim or take a bath for the first 2 weeks, or until your doctor tells you it is okay. Exercise    Your rehab program will give you a number of exercises to do to help you get back your knee's range of motion and strength. Always do them as your therapist tells you. Ice    For pain and swelling, put ice or a cold pack on the area for 10 to 20 minutes at a time. Put a thin cloth between the ice and your skin. If your doctor recommended cold therapy using a portable machine, follow the instructions that came with the machine. Other instructions    Wear compression stockings if your doctor told you to. These may help to prevent blood clots. Your doctor will tell you how long you need to keep wearing the compression stockings. Carry a medical alert card that says you have an artificial joint. You have metal pieces in your knee. These may set off some airport metal detectors. Follow-up care is a key part of your treatment and safety. Be sure to make and go to all appointments, and call your doctor if you are having problems. It's also a good idea to know your test results and keep a list of the medicines you take. When should you call for help? Call 911 anytime you think you may need emergency care. For example, call if:    You passed out (lost consciousness). You have severe trouble breathing. You have sudden chest pain and shortness of breath, or you cough up blood. Call your doctor now or seek immediate medical care if:    You have signs of infection, such as: Increased pain, swelling, warmth, or redness. Red streaks leading from the incision. Pus draining from the incision. A fever. You have signs of a blood clot, such as:  Pain in your calf, back of the knee, thigh, or groin. Redness and swelling in your leg or groin. Your incision comes open and begins to bleed, or the bleeding increases. You have pain that does not get better after you take pain medicine.    Watch closely for changes in your health, and be sure to contact your doctor if:    You do not have a bowel movement after taking a laxative. Where can you learn more? Go to http://www.woods.com/ and enter T054 to learn more about \"Total Knee Replacement: What to Expect at Home. \"  Current as of: March 9, 2022               Content Version: 13.5  © 2006-2022 [employer]. Care instructions adapted under license by [facility] ([facility]). If you have questions about a medical condition or this instruction, always ask your healthcare professional. [place] [address] any warranty or liability for your use of this information. Information obtained by :  I understand that if any problems occur once I am at home I am to contact my physician. I understand and acknowledge receipt of the instructions indicated above.                                                                                                                                            Physician's or R.N.'s Signature                                                                  Date/Time                                                                                                                                              Patient or Representative Signature                                                          Date/Time

## 2023-01-10 NOTE — OR NURSING
Bwetadine Lavage:  Betadine Solution 17.5 cc lot # [id number] exp. 10/31/2023 mixed with NS 0.9 % 500 cc lot # [id number] exp. 11/2025 used to wash out incision prior to closing- per MD protocol.

## 2023-01-10 NOTE — PERIOP NOTE
TRANSFER - OUT REPORT:    Verbal report given to [facility] RN on Anders Koch  being transferred to [facility] for routine progression of patient care       Report consisted of patient's Situation, Background, Assessment and   Recommendations(SBAR). Information from the following report(s) Nurse Handoff Report, Adult Overview, Surgery Report, MAR, and Cardiac Rhythm SR  was reviewed with the receiving nurse. [place] Assessment: No data recorded  Lines:   Peripheral IV 01/10/23 Left;Posterior Hand (Active)   Site Assessment Clean, dry & intact 01/10/23 1040   Line Status Infusing 01/10/23 [facility] checked and tightened 01/10/23 1040   Phlebitis Assessment No symptoms 01/10/23 1040   Infiltration Assessment 0 01/10/23 1040   Alcohol Cap Used No 01/10/23 1040   Dressing Status Clean, dry & intact 01/10/23 1040   Dressing Type Transparent 01/10/23 1040   Dressing Intervention New 01/10/23 0654        Opportunity for questions and clarification was provided.       Patient transported with:  O2 @ 2lpm

## 2023-01-10 NOTE — OP NOTE
[address] [place] [address] Robotic Assisted Total Knee Arthroplasty: Posterior Cruciate Retaining       Patient:Dorothy Neal   : 1950  Medical Record KOZNAK:[id number]      Pre-operative Diagnosis:  Left Knee Arthritis Degenerative arthritis of left knee [M17.12]  Post-operative Diagnosis: Same  Location: [facility]    Date of Procedure: 1/10/2023  Surgeon: Marilyn Peters MD  Assistant:  Amelia Mays. ORLANDO Roberts    Anesthesia: Spinal and  nerve block   BMI:Body mass index is 29.85 kg/m². CPT- 68556- Total knee arthroplasty           51296- Other procedures on musculoskeletal system            0055T- Computer assisted surgical navigation     Procedure:  Left Cementless Cruciate Retained Total Knee Arthroplasty     Tourniquet Time: none    EBL:  250 cc         The complexity of the total joint surgery requires the use of a first assistant for positioning, retraction and assistance in closure. ORLANDO Tena was this assistant. Luna Bailey was brought to the operating room and positioned on the operating table. She was anesthestized with anesthesia. IV antibiotics was administered. Prior to the incision being made a timeout was called identifying the patient, procedure ,operative side and surgeon The operative leg was prepped and draped in the usual sterile manner. An anterior longitudinal incision was accomplished just medial to the tibial tubercle and extending approximal 6 centimeters proximal to the superior pole of the patella over the left knee. A medial parapatellar capsular incision was performed. The medial capsular flap was elevated around to the insertion of the semimembranous tendon. The patella was everted and the knee flexed and externally rotated. The medial and external menisci were excised. The lateral half of the fat pad excised and the patella femoral ligament was released.   The anterior cruciate ligament was resect and the posterior cruciate ligament was retained. The femoral and tibial arrays were pinned in place and registered with the [place] [employer]. The patient landmarks were collected and the tibial and femoral checkpoints were registered and verified. The preresection balancing was performed. The osteophytes were then removed as exposed on the femoral and tibial surfaces. Utilizing the [facility] robotic arm the femoral and tibial cuts were accomplished. The tibia was sized. The tibial base plate was pinned into place with the appropriate external rotation and stem site prepared. A trial femoral component and poly was placed. A preliminary range of motion was accomplished with the trial components. The patient was found to obtain full extension as well as appropriate flexion. The patient's ligaments were stable in flexion and extension to medial and lateral stressing and the alignment was through the appropriate mechanical axis. Additional surgical releases were none. The patella was then everted. Osteophytes were removed. The patella articular surface was inspected with no wear was deemed acceptable to not resurface. All trial components were removed. The real implants were opened: Sizes selected were a size 2 femoral, 3 tibial, and a 11 mm polyethylene insert. The knee was irrigated. The real components were impacted into place. Lucy Ser [place] knee was placed through range of motion and noted to be stable as mentioned above with the trial components. The operative knee was injected with 60 cc of Naropin, 10 cc's of morphine and 1 cc of 30 mg of Toradol. The knee was then soaked with betadine for approximately 3 min. This was then thoroughly irrigated. The capsular layer was closed using a #1 PDS suture and a #1 Vicryl. Then, 1 gram (100 mg/ml) of Transexamic Acid and 1 gram of Vancomycin was injected into the joint space. The subcutaneous layers were closed using 2-0 vicryl.   The skin was closed using staples which were applied in occlusive fashion and sterile bandage applied. An Iceman cryo pad was applied on the operative leg. Sponge count and needle counts were correct. Gaye Given left the operating room     Implants:   Implant Name Type Inv.  Item Serial No.  Lot No. LRB No. Used Action   INSERT TIB CNDYL STBL 3 11 MM KNEE 5/PK X3 TRIATH[employer]ON - [id number]  INSERT TIB CNDYL STBL 3 11 MM KNEE 5/PK X3 TRIATHLON [id number] [email or web address] [id number] Left 1 Implanted   COMPONENT FEM SZ 2 L KNEE CRUCE RET CEMENTLESS BEAD W/ CHRISTOPHER - [id number]  COMPONENT FEM SZ 2 L KNEE CRUCE RET CEMENTLESS BEAD W/ CHRISTOPHER [id number] [email or web address] [id number] Left 1 Implanted   BASEPLATE TIB SZ 3 [id number] ML67MM KNEE TRITANIUM 4 CRUCFRM - [id number]  BASEPLATE TIB SZ 3 [id number] ML67MM KNEE TRITANIUM 4 CRUCFRM [id number] [email or web address] [id number] Left 1 Implanted         Signed By: Mookie Jenkins MD   1/10/2023,  9:20 AM

## 2023-01-11 ENCOUNTER — HOME CARE VISIT (OUTPATIENT)
Dept: SCHEDULING | Facility: HOME HEALTH | Age: 73
End: 2023-01-11

## 2023-01-11 ENCOUNTER — TELEPHONE (OUTPATIENT)
Dept: ORTHOPEDICS UNIT | Age: 73
End: 2023-01-11

## 2023-01-11 VITALS
HEART RATE: 82 BPM | OXYGEN SATURATION: 98 % | DIASTOLIC BLOOD PRESSURE: 76 MMHG | RESPIRATION RATE: 16 BRPM | SYSTOLIC BLOOD PRESSURE: 130 MMHG | TEMPERATURE: 99.7 F

## 2023-01-11 PROCEDURE — 0221000100 HH NO PAY CLAIM PROCEDURE

## 2023-01-11 PROCEDURE — G0151 HHCP-SERV OF PT,EA 15 MIN: HCPCS

## 2023-01-11 ASSESSMENT — ENCOUNTER SYMPTOMS
PAIN LOCATION - PAIN QUALITY: ACHEY
CONSTIPATION: 1

## 2023-01-11 NOTE — TELEPHONE ENCOUNTER
Called patient to follow up after TKA with SDD on 1/10/23. Reports \" I'm a little stiff with my knee, but it's better this morning. [facility] SOC is scheduled for this morning. Reviewed importance of ambulating at least every hour during the day. Had BM yesterday. Will continue miralax QD while taking narcotics. Reviewed cool jet ice machine protocol. Advised regarding expected increase in post op pain and swelling around POD # 3. No questions or concerns. Reviewed contact number for ortho navigator.

## 2023-01-13 ENCOUNTER — HOME CARE VISIT (OUTPATIENT)
Dept: SCHEDULING | Facility: HOME HEALTH | Age: 73
End: 2023-01-13

## 2023-01-13 VITALS
DIASTOLIC BLOOD PRESSURE: 80 MMHG | TEMPERATURE: 97.5 F | HEART RATE: 88 BPM | SYSTOLIC BLOOD PRESSURE: 136 MMHG | OXYGEN SATURATION: 94 % | RESPIRATION RATE: 17 BRPM

## 2023-01-13 PROCEDURE — G0157 HHC PT ASSISTANT EA 15: HCPCS

## 2023-01-13 ASSESSMENT — ENCOUNTER SYMPTOMS: PAIN LOCATION - PAIN QUALITY: STABBING

## 2023-01-16 ENCOUNTER — HOME CARE VISIT (OUTPATIENT)
Dept: SCHEDULING | Facility: HOME HEALTH | Age: 73
End: 2023-01-16

## 2023-01-16 VITALS
RESPIRATION RATE: 17 BRPM | TEMPERATURE: 97.6 F | HEART RATE: 101 BPM | OXYGEN SATURATION: 96 % | SYSTOLIC BLOOD PRESSURE: 122 MMHG | DIASTOLIC BLOOD PRESSURE: 72 MMHG

## 2023-01-16 PROCEDURE — G0157 HHC PT ASSISTANT EA 15: HCPCS

## 2023-01-16 ASSESSMENT — ENCOUNTER SYMPTOMS: PAIN LOCATION - PAIN QUALITY: ACHING

## 2023-01-19 ENCOUNTER — HOME CARE VISIT (OUTPATIENT)
Dept: SCHEDULING | Facility: HOME HEALTH | Age: 73
End: 2023-01-19

## 2023-01-19 ENCOUNTER — TELEPHONE (OUTPATIENT)
Dept: ORTHOPEDIC SURGERY | Age: 73
End: 2023-01-19

## 2023-01-19 VITALS
DIASTOLIC BLOOD PRESSURE: 72 MMHG | SYSTOLIC BLOOD PRESSURE: 130 MMHG | RESPIRATION RATE: 17 BRPM | HEART RATE: 82 BPM | OXYGEN SATURATION: 96 % | TEMPERATURE: 97.6 F

## 2023-01-19 PROCEDURE — G0157 HHC PT ASSISTANT EA 15: HCPCS

## 2023-01-19 ASSESSMENT — ENCOUNTER SYMPTOMS: PAIN LOCATION - PAIN QUALITY: ACHING

## 2023-01-20 ENCOUNTER — HOME CARE VISIT (OUTPATIENT)
Dept: SCHEDULING | Facility: HOME HEALTH | Age: 73
End: 2023-01-20

## 2023-01-20 DIAGNOSIS — Z96.652 STATUS POST TOTAL LEFT KNEE REPLACEMENT: Primary | ICD-10-CM

## 2023-01-20 PROCEDURE — G0157 HHC PT ASSISTANT EA 15: HCPCS

## 2023-01-20 RX ORDER — OXYCODONE HYDROCHLORIDE 5 MG/1
5 TABLET ORAL EVERY 4 HOURS PRN
Qty: 30 TABLET | Refills: 0 | Status: SHIPPED | OUTPATIENT
Start: 2023-01-20 | End: 2023-01-28

## 2023-01-22 VITALS
OXYGEN SATURATION: 95 % | DIASTOLIC BLOOD PRESSURE: 74 MMHG | RESPIRATION RATE: 17 BRPM | TEMPERATURE: 97.8 F | SYSTOLIC BLOOD PRESSURE: 130 MMHG | HEART RATE: 83 BPM

## 2023-01-22 ASSESSMENT — ENCOUNTER SYMPTOMS: PAIN LOCATION - PAIN QUALITY: ACHING

## 2023-01-23 ENCOUNTER — HOME CARE VISIT (OUTPATIENT)
Dept: SCHEDULING | Facility: HOME HEALTH | Age: 73
End: 2023-01-23

## 2023-01-23 VITALS
OXYGEN SATURATION: 96 % | DIASTOLIC BLOOD PRESSURE: 70 MMHG | RESPIRATION RATE: 17 BRPM | HEART RATE: 101 BPM | TEMPERATURE: 97.4 F | SYSTOLIC BLOOD PRESSURE: 138 MMHG

## 2023-01-23 DIAGNOSIS — E78.00 PURE HYPERCHOLESTEROLEMIA, UNSPECIFIED: ICD-10-CM

## 2023-01-23 DIAGNOSIS — F33.1 MAJOR DEPRESSIVE DISORDER, RECURRENT, MODERATE (HCC): ICD-10-CM

## 2023-01-23 PROCEDURE — G0157 HHC PT ASSISTANT EA 15: HCPCS

## 2023-01-23 RX ORDER — ATORVASTATIN CALCIUM 20 MG/1
TABLET, FILM COATED ORAL
Qty: 90 TABLET | Refills: 3 | Status: SHIPPED | OUTPATIENT
Start: 2023-01-23

## 2023-01-23 RX ORDER — DULOXETIN HYDROCHLORIDE 30 MG/1
CAPSULE, DELAYED RELEASE ORAL
Qty: 90 CAPSULE | Refills: 3 | Status: SHIPPED | OUTPATIENT
Start: 2023-01-23

## 2023-01-23 ASSESSMENT — ENCOUNTER SYMPTOMS: PAIN LOCATION - PAIN QUALITY: ACHING

## 2023-01-23 NOTE — TELEPHONE ENCOUNTER
Last OV: 12/6/2022  Next OV: 12/7/2023 with labs prior. Medication: DULoxetine (CYMBALTA) 30 MG extended release capsule  Last Written: 8/19/22  For: 90 with 1 refill. Medication: atorvastatin (LIPITOR) 20 MG tablet  Last Written: 8/19/22  For: 90 with 1 refill.

## 2023-01-26 ENCOUNTER — HOME CARE VISIT (OUTPATIENT)
Dept: SCHEDULING | Facility: HOME HEALTH | Age: 73
End: 2023-01-26

## 2023-01-26 VITALS
OXYGEN SATURATION: 97 % | HEART RATE: 88 BPM | DIASTOLIC BLOOD PRESSURE: 74 MMHG | TEMPERATURE: 97.8 F | RESPIRATION RATE: 17 BRPM | SYSTOLIC BLOOD PRESSURE: 122 MMHG

## 2023-01-26 PROCEDURE — G0157 HHC PT ASSISTANT EA 15: HCPCS

## 2023-01-26 ASSESSMENT — ENCOUNTER SYMPTOMS: DYSPNEA ACTIVITY LEVEL: AFTER AMBULATING MORE THAN 20 FT

## 2023-01-28 ASSESSMENT — ENCOUNTER SYMPTOMS: PAIN LOCATION - PAIN QUALITY: ACHING

## 2023-01-30 ENCOUNTER — HOME CARE VISIT (OUTPATIENT)
Dept: SCHEDULING | Facility: HOME HEALTH | Age: 73
End: 2023-01-30
Payer: MEDICARE

## 2023-01-30 ENCOUNTER — HOME CARE VISIT (OUTPATIENT)
Dept: HOME HEALTH SERVICES | Facility: HOME HEALTH | Age: 73
End: 2023-01-30
Payer: MEDICARE

## 2023-01-30 VITALS
HEART RATE: 99 BPM | SYSTOLIC BLOOD PRESSURE: 130 MMHG | DIASTOLIC BLOOD PRESSURE: 70 MMHG | TEMPERATURE: 97.5 F | OXYGEN SATURATION: 93 % | RESPIRATION RATE: 17 BRPM

## 2023-01-30 PROCEDURE — G0157 HHC PT ASSISTANT EA 15: HCPCS

## 2023-01-30 ASSESSMENT — ENCOUNTER SYMPTOMS: PAIN LOCATION - PAIN QUALITY: ACHING

## 2023-01-30 NOTE — CASE COMMUNICATION
Please send RX for OP PT today or tomorrow to [facility] in [place].  They will not hold her visit date until they get RX. Their fax number is [phone].   Thank you

## 2023-01-31 ENCOUNTER — HOME CARE VISIT (OUTPATIENT)
Dept: HOME HEALTH SERVICES | Facility: HOME HEALTH | Age: 73
End: 2023-01-31
Payer: MEDICARE

## 2023-01-31 ENCOUNTER — TELEPHONE (OUTPATIENT)
Dept: FAMILY MEDICINE CLINIC | Facility: CLINIC | Age: 73
End: 2023-01-31

## 2023-01-31 ENCOUNTER — TELEPHONE (OUTPATIENT)
Dept: ORTHOPEDIC SURGERY | Age: 73
End: 2023-01-31

## 2023-01-31 DIAGNOSIS — Z96.652 STATUS POST TOTAL LEFT KNEE REPLACEMENT: Primary | ICD-10-CM

## 2023-01-31 RX ORDER — OXYCODONE HYDROCHLORIDE 5 MG/1
5 TABLET ORAL EVERY 6 HOURS PRN
Qty: 20 TABLET | Refills: 0 | Status: SHIPPED | OUTPATIENT
Start: 2023-01-31 | End: 2023-02-05

## 2023-01-31 RX ORDER — ONDANSETRON 4 MG/1
4 TABLET, ORALLY DISINTEGRATING ORAL EVERY 8 HOURS PRN
Qty: 20 TABLET | Refills: 0 | Status: SHIPPED | OUTPATIENT
Start: 2023-01-31

## 2023-01-31 RX ORDER — METHOCARBAMOL 750 MG/1
750 TABLET, FILM COATED ORAL 4 TIMES DAILY
Qty: 40 TABLET | Refills: 0 | Status: SHIPPED | OUTPATIENT
Start: 2023-01-31 | End: 2023-02-10

## 2023-01-31 NOTE — CASE COMMUNICATION
* DULoxetine (CYMBALTA) 30 MG extended release capsule [DISCONTINUED] Take 1 capsule by mouth daily 8/19/2022 1/23/2023 90 capsule 1   * DULoxetine (CYMBALTA) 60 MG extended release capsule Take 60 mg by mouth daily. Pt is taking 30 mg;is going to call M.D. to get back on the 60 for total of 90mg. She was taken 90 mg before surgery and feels she needs to additional due to depression.

## 2023-01-31 NOTE — TELEPHONE ENCOUNTER
Patient called, spoke with patient, stated had knee surgery 3 weeks ago, discharge summary said to stop her Cymbalta 60 mg , she is still taking Cymbalta 30 mg, does not know why she was told to stop 60 mg and continue 30 mg.  Patient asking Dr Taiwo Odom if she can re-start  60 mg   Stated \" needs the extra 60 mg\"

## 2023-02-01 ENCOUNTER — HOME CARE VISIT (OUTPATIENT)
Dept: SCHEDULING | Facility: HOME HEALTH | Age: 73
End: 2023-02-01
Payer: MEDICARE

## 2023-02-01 VITALS
DIASTOLIC BLOOD PRESSURE: 74 MMHG | OXYGEN SATURATION: 95 % | SYSTOLIC BLOOD PRESSURE: 124 MMHG | HEART RATE: 93 BPM | RESPIRATION RATE: 18 BRPM | TEMPERATURE: 98.3 F

## 2023-02-01 PROCEDURE — G0151 HHCP-SERV OF PT,EA 15 MIN: HCPCS

## 2023-02-06 ENCOUNTER — OFFICE VISIT (OUTPATIENT)
Dept: ORTHOPEDIC SURGERY | Age: 73
End: 2023-02-06

## 2023-02-06 DIAGNOSIS — Z96.652 STATUS POST LEFT KNEE REPLACEMENT: Primary | ICD-10-CM

## 2023-02-06 PROCEDURE — 99024 POSTOP FOLLOW-UP VISIT: CPT | Performed by: ORTHOPAEDIC SURGERY

## 2023-02-06 NOTE — PROGRESS NOTES
Name: Teodoro Tirado  YOB: 1950  Gender: female  MRN: [id number]      Current Outpatient Medications:     ondansetron (ZOFRAN-ODT) 4 MG disintegrating tablet, Take 1 tablet by mouth every 8 hours as needed for Nausea or Vomiting, Disp: 20 tablet, Rfl: 0    methocarbamol (ROBAXIN-750) 750 MG tablet, Take 1 tablet by mouth 4 times daily for 10 days, Disp: 40 tablet, Rfl: 0    atorvastatin (LIPITOR) 20 MG tablet, TAKE 1 TABLET EVERY DAY, Disp: 90 tablet, Rfl: 3    DULoxetine (CYMBALTA) 30 MG extended release capsule, TAKE 1 CAPSULE EVERY DAY, Disp: 90 capsule, Rfl: 3    DULoxetine (CYMBALTA) 60 MG extended release capsule, Take 60 mg by mouth daily. , Disp: , Rfl:     Cholecalciferol (VITAMIN D3) 125 MCG (5000 UT) TABS, Take 1 tablet by mouth daily. , Disp: , Rfl:     B Complex Vitamins (VITAMIN B COMPLEX PO), Take 1 tablet by mouth daily. , Disp: , Rfl:     polyethylene glycol (MIRALAX) 17 GM/SCOOP powder, Take 17 g by mouth 2 times daily. Use until has first bowel movement twice daily and then once daily until bowels are regular again. .. mixes powder in with 4-6oz water, Disp: , Rfl:     acetaminophen (TYLENOL) 500 MG tablet, Take 1,000 mg by mouth every 6 hours as needed for Pain For breakthrough pain and min to mod levels of pain of 4/10 or less, Disp: , Rfl:     aspirin 81 MG EC tablet, Take 1 tablet by mouth 2 times daily, Disp: 70 tablet, Rfl: 0    furosemide (LASIX) 40 MG tablet, Take 1 tablet by mouth daily TAKE 1 TABLET EVERY DAY, Disp: 90 tablet, Rfl: 3    lansoprazole (PREVACID) 30 MG delayed release capsule, Take 1 capsule by mouth daily TAKE 1 CAPSULE DAILY (BEFORE BREAKFAST). , Disp: 90 capsule, Rfl: 3  Allergies   Allergen Reactions    Sulfa Antibiotics Nausea And Vomiting and Rash       Post-op left TKA    The patient returns now post total knee arthroplasty. Their pain is diminishing and the wound healing. Their range of motion is limited (3-85 degrees).       Radiographs: AP/Lateral and sunrise of the left knee taken in the office today reveal a good bone, prosthetic appearance. The patella is balanced. Radiographic Diagnosis:  Normal post-operative total knee. Recommendations:  Reviewed x-ray findings with the patient. They are to increase their weight bearing status as tolerated. A cane can be used in transition. They are to follow the routine rehabilitation protocol. Instructed her to continue cold therapy and elevation when not up to help control swelling and improve knee motion. They are to wean from their pain medications as tolerated. Patient will return in 6 weeks for ROM recheck.

## 2023-02-07 ENCOUNTER — TELEPHONE (OUTPATIENT)
Dept: ORTHOPEDIC SURGERY | Age: 73
End: 2023-02-07

## 2023-02-22 ENCOUNTER — HOSPITAL ENCOUNTER (OUTPATIENT)
Dept: MAMMOGRAPHY | Age: 73
Discharge: HOME | End: 2023-02-25
Payer: MEDICARE

## 2023-02-22 DIAGNOSIS — R92.8 ABNORMAL SCREENING MAMMOGRAM: ICD-10-CM

## 2023-02-22 PROCEDURE — 77065 DX MAMMO INCL CAD UNI: CPT

## 2023-02-26 DIAGNOSIS — F33.1 MAJOR DEPRESSIVE DISORDER, RECURRENT, MODERATE (HCC): ICD-10-CM

## 2023-02-27 ENCOUNTER — TELEPHONE (OUTPATIENT)
Dept: ORTHOPEDIC SURGERY | Age: 73
End: 2023-02-27

## 2023-02-27 ENCOUNTER — CLINICAL DOCUMENTATION (OUTPATIENT)
Dept: ORTHOPEDIC SURGERY | Age: 73
End: 2023-02-27

## 2023-02-27 DIAGNOSIS — M54.16 RADICULOPATHY, LUMBAR REGION: Primary | ICD-10-CM

## 2023-02-27 RX ORDER — DULOXETIN HYDROCHLORIDE 30 MG/1
CAPSULE, DELAYED RELEASE ORAL
Qty: 90 CAPSULE | Refills: 3 | OUTPATIENT
Start: 2023-02-27

## 2023-02-27 NOTE — TELEPHONE ENCOUNTER
oscar    Name: Bereket Cabrera  YOB: 1950  Gender: female  MRN: [id number]      This patient has requested repeat LYNDA. The most recent injection provided 80% pain reduction and improvement in functioning for at least 6 months. The patient's current pain scale is 8/10. .    As a result of the current recurrence of pain, the patient is having the following difficulties:  Difficulties with bathing and/or dressing  Difficulty sleeping at night  Difficulty transferring into or out of a car  Difficulty performing housework  Difficulty with sitting or performing work related activities    The patient has severe pain limiting function despite on-going, conservative, physician-guided treatment. The patient is currently regularly engaging in physician or PT directed lumbar spine exercies.       Sp Gracia MD

## 2023-02-27 NOTE — LETTER
l     Name: Clair Litten  YOB: 1950  Gender: female  MRN: [id number]        This patient has requested repeat LYNDA? ? .     The most recent injection provided 80% pain reduction and improvement in functioning for at least 6 months.     The patient's current pain scale is 8/10.????? .     As a result of the current recurrence of pain, the patient is having the following difficulties:  Difficulties with bathing and/or dressing  Difficulty sleeping at night  Difficulty transferring into or out of a car  Difficulty performing housework  Difficulty with sitting or performing work related activities     The patient has severe pain limiting function despite on-going, conservative, physician-guided treatment.     The patient is currently regularly engaging in physician or PT directed lumbar spine exercies.        TONIA Nation MD

## 2023-02-27 NOTE — TELEPHONE ENCOUNTER
Called and left VM for patient to return my call and schedule repeat LYNDA with [facility] 3/1 or after

## 2023-03-03 ENCOUNTER — OFFICE VISIT (OUTPATIENT)
Dept: ORTHOPEDIC SURGERY | Age: 73
End: 2023-03-03

## 2023-03-03 DIAGNOSIS — M54.16 RADICULOPATHY, LUMBAR REGION: Primary | ICD-10-CM

## 2023-03-03 DIAGNOSIS — M54.50 LUMBAR BACK PAIN: ICD-10-CM

## 2023-03-03 RX ORDER — TRIAMCINOLONE ACETONIDE 40 MG/ML
220 INJECTION, SUSPENSION INTRA-ARTICULAR; INTRAMUSCULAR ONCE
Status: COMPLETED | OUTPATIENT
Start: 2023-03-03 | End: 2023-03-03

## 2023-03-03 RX ORDER — TRAMADOL HYDROCHLORIDE 50 MG/1
50 TABLET ORAL 2 TIMES DAILY PRN
Qty: 60 TABLET | Refills: 5 | Status: SHIPPED | OUTPATIENT
Start: 2023-03-03 | End: 2023-04-02

## 2023-03-03 RX ADMIN — TRIAMCINOLONE ACETONIDE 220 MG: 40 INJECTION, SUSPENSION INTRA-ARTICULAR; INTRAMUSCULAR at 10:28

## 2023-03-03 NOTE — PROGRESS NOTES
Date: 03/03/23   Name: Viet Miguel [facility]    Pre-Procedural Diagnosis:    Diagnosis Orders   1. Radiculopathy, lumbar region  XR INJ SPINE THER SUBST LUM/SAC W IMG    triamcinolone acetonide (KENALOG-40) injection 220 mg      2. Lumbar back pain  INJECT TRIGGER POINT, 1 OR 2    triamcinolone acetonide (KENALOG-40) injection 220 mg          Procedure: Lumbar Epidural Steroid Injection (LYNDA) with Trigger Point Injection(s)    Precautions: [facility] Precautions spine injections: None. Patient denies any prior sensitivity to steroid, local anesthetic, contrast dye, iodine or shellfish. The procedure was discussed at length with the patient and informed consent was signed. The patient was placed in a prone position on the fluoroscopy table and the skin was prepped and draped in a routine sterile fashion. The areas to be injected were each anesthetized with approximately 5 cc of 1% Lidocaine. Initially a 22-gauge 3.5 inch inch spinal needle was carefully advanced under fluoroscopic guidance to the left L5-S1 epidural space. At this time 0.25 cc of omnipaque administered. . Once proper placement was confirmed, 1.5 cc of sterile water and 100 mg of Kenalog were injected through the spinal needle. After informed oral consent, patient was prepped per routine. The bilateral lumbar paraspinal area(s) were injected. 60 mg of Kenalog with 1 cc of 0.25% Marcaine injected at each site. Patient tolerated well. Band aid(s) applied. Fluoroscopic guidance was used intermittently over a 10-minute period to insure proper needle placement and patient safety. A hard copy of the fluoroscopic  images has been placed in the patient's chart. The patient was monitored after the procedure and discharged home without complication. A total of two muscles/sites injected today for trigger point charge.     Resume Meds:  N/A    James Long MD  03/03/23

## 2023-03-20 ENCOUNTER — OFFICE VISIT (OUTPATIENT)
Dept: ORTHOPEDIC SURGERY | Age: 73
End: 2023-03-20

## 2023-03-20 DIAGNOSIS — Z96.652 STATUS POST LEFT KNEE REPLACEMENT: Primary | ICD-10-CM

## 2023-03-20 PROCEDURE — 99024 POSTOP FOLLOW-UP VISIT: CPT | Performed by: ORTHOPAEDIC SURGERY

## 2023-03-20 NOTE — PROGRESS NOTES
Now 8 weeks post left knee arthroplasty. He has continued to work with physical therapy and increases her strength and functionality. She can walk stairs without any assistance. She does not need a cane. She has 102+ degrees of motion with therapy. On exam she looks almost to have 110 degrees of flexion with me today. She walks reciprocating gait. No instability. She will work on physical therapy and activities over the next few weeks.   I will plan on seeing her back in 4 months or x-rays in 6 months about the left knee

## 2023-04-10 DIAGNOSIS — M47.816 SPONDYLOSIS WITHOUT MYELOPATHY OR RADICULOPATHY, LUMBAR REGION: ICD-10-CM

## 2023-04-10 DIAGNOSIS — M54.16 RADICULOPATHY, LUMBAR REGION: ICD-10-CM

## 2023-04-10 DIAGNOSIS — M48.061 SPINAL STENOSIS, LUMBAR REGION WITHOUT NEUROGENIC CLAUDICATION: ICD-10-CM

## 2023-04-10 DIAGNOSIS — M54.17 RADICULOPATHY, LUMBOSACRAL REGION: ICD-10-CM

## 2023-04-10 RX ORDER — LANSOPRAZOLE 30 MG/1
CAPSULE, DELAYED RELEASE ORAL
Qty: 90 CAPSULE | Refills: 3 | OUTPATIENT
Start: 2023-04-10

## 2023-04-10 RX ORDER — FUROSEMIDE 40 MG/1
TABLET ORAL
Qty: 90 TABLET | Refills: 3 | OUTPATIENT
Start: 2023-04-10

## 2023-04-11 RX ORDER — TRAMADOL HYDROCHLORIDE 50 MG/1
TABLET ORAL
Qty: 60 TABLET | OUTPATIENT
Start: 2023-04-11

## 2023-04-17 NOTE — TELEPHONE ENCOUNTER
Patient requesting refill on DULoxetine (CYMBALTA) 60 MG extended release capsule to [address]. Patient advised is to be taking 30 mg and 60 mg daily and only the 30 mg was called in.

## 2023-04-18 RX ORDER — DULOXETIN HYDROCHLORIDE 60 MG/1
60 CAPSULE, DELAYED RELEASE ORAL DAILY
Qty: 90 CAPSULE | Refills: 3 | Status: SHIPPED | OUTPATIENT
Start: 2023-04-18

## 2023-05-02 ENCOUNTER — OFFICE VISIT (OUTPATIENT)
Dept: FAMILY MEDICINE CLINIC | Facility: CLINIC | Age: 73
End: 2023-05-02
Payer: MEDICARE

## 2023-05-02 VITALS
WEIGHT: 146 LBS | HEART RATE: 93 BPM | OXYGEN SATURATION: 96 % | DIASTOLIC BLOOD PRESSURE: 66 MMHG | HEIGHT: 61 IN | SYSTOLIC BLOOD PRESSURE: 132 MMHG | BODY MASS INDEX: 27.56 KG/M2

## 2023-05-02 DIAGNOSIS — S81.832A PUNCTURE WOUND OF LEFT LOWER EXTREMITY EXCLUDING THIGH, INITIAL ENCOUNTER: ICD-10-CM

## 2023-05-02 DIAGNOSIS — L03.116 CELLULITIS OF LEFT LOWER LEG: ICD-10-CM
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
## 2023-05-02 DIAGNOSIS — E66.3 OVERWEIGHT (BMI 25.0-29.9): Chronic | ICD-10-CM

## 2023-05-02 DIAGNOSIS — F33.0 MILD EPISODE OF RECURRENT MAJOR DEPRESSIVE DISORDER (HCC): ICD-10-CM
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
## 2023-05-02 PROCEDURE — 3078F DIAST BP <80 MM HG: CPT | Performed by: FAMILY MEDICINE

## 2023-05-02 PROCEDURE — G8427 DOCREV CUR MEDS BY ELIG CLIN: HCPCS | Performed by: FAMILY MEDICINE

## 2023-05-02 PROCEDURE — 3075F SYST BP GE 130 - 139MM HG: CPT | Performed by: FAMILY MEDICINE

## 2023-05-02 PROCEDURE — 99213 OFFICE O/P EST LOW 20 MIN: CPT | Performed by: FAMILY MEDICINE

## 2023-05-02 PROCEDURE — G8417 CALC BMI ABV UP PARAM F/U: HCPCS | Performed by: FAMILY MEDICINE

## 2023-05-02 PROCEDURE — 1036F TOBACCO NON-USER: CPT | Performed by: FAMILY MEDICINE

## 2023-05-02 PROCEDURE — 1123F ACP DISCUSS/DSCN MKR DOCD: CPT | Performed by: FAMILY MEDICINE

## 2023-05-02 PROCEDURE — 3017F COLORECTAL CA SCREEN DOC REV: CPT | Performed by: FAMILY MEDICINE

## 2023-05-02 PROCEDURE — G8400 PT W/DXA NO RESULTS DOC: HCPCS | Performed by: FAMILY MEDICINE

## 2023-05-02 PROCEDURE — 1090F PRES/ABSN URINE INCON ASSESS: CPT | Performed by: FAMILY MEDICINE

## 2023-05-02 RX ORDER — AMOXICILLIN AND CLAVULANATE POTASSIUM 875; 125 MG/1; MG/1
1 TABLET, FILM COATED ORAL 2 TIMES DAILY
Qty: 14 TABLET | Refills: 0 | Status: SHIPPED | OUTPATIENT
Start: 2023-05-02 | End: 2023-05-09

## 2023-05-02 NOTE — PROGRESS NOTES
Dar Martinez DO                Diplomate of the [employer] Board of [facility] [place]         [phone]    Tanvir Chen is a 68 y.o. female who was seen on 5/2/2023 for   Chief Complaint   Patient presents with    Puncture Wound     4/29/2023 Rooster attack left calf       Assessment & Plan     Diagnosis Orders   1. Struck by [employer], initial encounter      DOI: 4/29/23      2. Garden or yard in single-family (private) house as the place of occurrence of the external cause        3. Puncture wound of left lower extremity excluding thigh, initial encounter        4. Cellulitis of left lower leg  amoxicillin-clavulanate (AUGMENTIN) 875-125 MG per tablet      5. Mild episode of recurrent major depressive disorder ([address]      Well-controlled. Continue current medication      6. Overweight (BMI 25.0-29. 9)      Marked improvement. Low carb diet advised/reviewed. Information given. Follow-up and Dispositions    Return if symptoms worsen or fail to improve, for PREV SCHED APPT. RECENT LABS/TESTS TO REVIEW and DISCUSS    No results found for this visit on 05/02/23. Subjective    HPI:     This is a 76-year-old female patient who was the victim of a unprovoked rooster attack on 4/29/2023. The rooster was her own. He was previously unnamed but she now calls him \"Oxana. \"  Salvatore Zepedaer her with his spurs at least twice. She has 3 healing puncture wounds on her left calf. The one in the middle is starting to become infected with erythema. It is tender to touch. A Steri-Strip was used to stop the bleeding from the middle puncture and this remains in place. There is no bleeding currently. She was prescribed Augmentin for the cellulitis which appears to be developing. She is up to date on her immunizations. Oxana has not had any. .. She will follow up if worsening.  The wound was re-bandaged and instructions on

## 2023-05-17 ENCOUNTER — TELEMEDICINE (OUTPATIENT)
Dept: FAMILY MEDICINE CLINIC | Facility: CLINIC | Age: 73
End: 2023-05-17
Payer: MEDICARE

## 2023-05-17 DIAGNOSIS — Z00.00 MEDICARE ANNUAL WELLNESS VISIT, SUBSEQUENT: Primary | ICD-10-CM

## 2023-05-17 DIAGNOSIS — Z78.0 POST-MENOPAUSAL: ICD-10-CM

## 2023-05-17 DIAGNOSIS — N95.1 MENOPAUSAL STATE: ICD-10-CM

## 2023-05-17 PROCEDURE — 3017F COLORECTAL CA SCREEN DOC REV: CPT | Performed by: FAMILY MEDICINE

## 2023-05-17 PROCEDURE — G0439 PPPS, SUBSEQ VISIT: HCPCS | Performed by: FAMILY MEDICINE

## 2023-05-17 PROCEDURE — 1123F ACP DISCUSS/DSCN MKR DOCD: CPT | Performed by: FAMILY MEDICINE

## 2023-05-17 SDOH — ECONOMIC STABILITY: INCOME INSECURITY: HOW HARD IS IT FOR YOU TO PAY FOR THE VERY BASICS LIKE FOOD, HOUSING, MEDICAL CARE, AND HEATING?: NOT HARD AT ALL

## 2023-05-17 SDOH — ECONOMIC STABILITY: FOOD INSECURITY: WITHIN THE PAST 12 MONTHS, THE FOOD YOU BOUGHT JUST DIDN'T LAST AND YOU DIDN'T HAVE MONEY TO GET MORE.: NEVER TRUE

## 2023-05-17 SDOH — ECONOMIC STABILITY: HOUSING INSECURITY
IN THE LAST 12 MONTHS, WAS THERE A TIME WHEN YOU DID NOT HAVE A STEADY PLACE TO SLEEP OR SLEPT IN A SHELTER (INCLUDING NOW)?: NO

## 2023-05-17 SDOH — ECONOMIC STABILITY: FOOD INSECURITY: WITHIN THE PAST 12 MONTHS, YOU WORRIED THAT YOUR FOOD WOULD RUN OUT BEFORE YOU GOT MONEY TO BUY MORE.: NEVER TRUE

## 2023-05-17 ASSESSMENT — PATIENT HEALTH QUESTIONNAIRE - PHQ9
7. TROUBLE CONCENTRATING ON THINGS, SUCH AS READING THE NEWSPAPER OR WATCHING TELEVISION: 0
SUM OF ALL RESPONSES TO PHQ QUESTIONS 1-9: 3
2. FEELING DOWN, DEPRESSED OR HOPELESS: 0
SUM OF ALL RESPONSES TO PHQ QUESTIONS 1-9: 3
SUM OF ALL RESPONSES TO PHQ QUESTIONS 1-9: 3
5. POOR APPETITE OR OVEREATING: 0
4. FEELING TIRED OR HAVING LITTLE ENERGY: 0
SUM OF ALL RESPONSES TO PHQ9 QUESTIONS 1 & 2: 0
10. IF YOU CHECKED OFF ANY PROBLEMS, HOW DIFFICULT HAVE THESE PROBLEMS MADE IT FOR YOU TO DO YOUR WORK, TAKE CARE OF THINGS AT HOME, OR GET ALONG WITH OTHER PEOPLE: 0
1. LITTLE INTEREST OR PLEASURE IN DOING THINGS: 0
6. FEELING BAD ABOUT YOURSELF - OR THAT YOU ARE A FAILURE OR HAVE LET YOURSELF OR YOUR FAMILY DOWN: 0
9. THOUGHTS THAT YOU WOULD BE BETTER OFF DEAD, OR OF HURTING YOURSELF: 0
8. MOVING OR SPEAKING SO SLOWLY THAT OTHER PEOPLE COULD HAVE NOTICED. OR THE OPPOSITE, BEING SO FIGETY OR RESTLESS THAT YOU HAVE BEEN MOVING AROUND A LOT MORE THAN USUAL: 0
SUM OF ALL RESPONSES TO PHQ QUESTIONS 1-9: 3
3. TROUBLE FALLING OR STAYING ASLEEP: 3

## 2023-05-17 ASSESSMENT — LIFESTYLE VARIABLES
HOW MANY STANDARD DRINKS CONTAINING ALCOHOL DO YOU HAVE ON A TYPICAL DAY: PATIENT DOES NOT DRINK
HOW OFTEN DO YOU HAVE A DRINK CONTAINING ALCOHOL: NEVER

## 2023-05-17 NOTE — PROGRESS NOTES
Medicare Annual Wellness Visit    Amy Pierce is here for Medicare AWV    Assessment & Plan   Medicare annual wellness visit, subsequent  Menopausal state  -     DEXA BONE DENSITY AXIAL SKELETON; Future  Post-menopausal  -     DEXA BONE DENSITY AXIAL SKELETON; Future      Recommendations for Preventive Services Due: see orders and patient instructions/AVS.  Recommended screening schedule for the next 5-10 years is provided to the patient in written form: see Patient Instructions/AVS.     No follow-ups on file. Subjective       Patient's complete Health Risk Assessment and screening values have been reviewed and are found in Flowsheets. The following problems were reviewed today and where indicated follow up appointments were made and/or referrals ordered. Positive Risk Factor Screenings with Interventions:    Fall Risk:  Do you feel unsteady or are you worried about falling? : no  2 or more falls in past year?: (!) yes  Fall with injury in past year?: no     Interventions:    See AVS for additional education material                    Advanced Directives:  Do you have a Living Will?: (!) No    Intervention:         Lung Cancer Screening:  LDCT Screening: Discussed with patient the benefits and harms of screening, follow-up diagnostic testing, over-diagnosis, false positive rate, and total radiation exposure. Counseled on the importance of adherence to annual lung cancer LDCT screening, impact of comorbidities, ability and willingness to undergo diagnosis and treatment. Counseled on the importance of maintaining cigarette smoking abstinence and cessation. The patient has a history of >20 pack years and is either still smoking or quit within the last 15 years. There are no signs or symptoms of lung cancer. THE PATIENT WAS ADVISED (LAST TIME I ORDERED THIS) that she would have to pay a significant amount. Will check on this with radiology.                     Objective      Patient-Reported

## 2023-05-17 NOTE — PATIENT INSTRUCTIONS
(www.caringinfo.org/planning/advance-directives/). Follow-up care is a key part of your treatment and safety. Be sure to make and go to all appointments, and call your doctor if you are having problems. It's also a good idea to know your test results and keep a list of the medicines you take. What should you include in an advance directive? Many states have a unique advance directive form. (It may ask you to address specific issues.) Or you might use a universal form that's approved by many states. If your form doesn't tell you what to address, it may be hard to know what to include in your advance directive. Use the questions below to help you get started. Who do you want to make decisions about your medical care if you are not able to? What life-support measures do you want if you have a serious illness that gets worse over time or can't be cured? What are you most afraid of that might happen? (Maybe you're afraid of having pain, losing your independence, or being kept alive by machines.)  Where would you prefer to die? (Your home? A hospital? A nursing home?)  Do you want to donate your organs when you die? Do you want certain [employer]ism practices performed before you die? When should you call for help? Be sure to contact your doctor if you have any questions. Where can you learn more? Go to http://www.duncan.com/ and enter R264 to learn more about \"Advance Directives: Care Instructions. \"  Current as of: June 16, 2022               Content Version: 13.6  © 2006-2023 Healthwise, Incorporated. Care instructions adapted under license by [facility] ([facility]). If you have questions about a medical condition or this instruction, always ask your healthcare professional. [address] any warranty or liability for your use of this information.            A Healthy Heart: Care Instructions  Your Care Instructions     Coronary artery disease, also called heart disease, occurs when a

## 2023-05-30 RX ORDER — FUROSEMIDE 40 MG/1
40 TABLET ORAL DAILY
Qty: 90 TABLET | Refills: 3 | Status: SHIPPED | OUTPATIENT
Start: 2023-05-30

## 2023-05-30 RX ORDER — LANSOPRAZOLE 30 MG/1
30 CAPSULE, DELAYED RELEASE ORAL DAILY
Qty: 90 CAPSULE | Refills: 3 | Status: SHIPPED | OUTPATIENT
Start: 2023-05-30

## 2023-06-01 ENCOUNTER — HOSPITAL ENCOUNTER (OUTPATIENT)
Dept: MAMMOGRAPHY | Age: 73
Discharge: HOME | End: 2023-06-01
Payer: MEDICARE

## 2023-06-01 DIAGNOSIS — N95.1 MENOPAUSAL STATE: ICD-10-CM

## 2023-06-01 DIAGNOSIS — Z78.0 POST-MENOPAUSAL: ICD-10-CM

## 2023-06-01 PROCEDURE — 77080 DXA BONE DENSITY AXIAL: CPT

## 2023-06-29 ENCOUNTER — TELEPHONE (OUTPATIENT)
Dept: ORTHOPEDIC SURGERY | Age: 73
End: 2023-06-29

## 2023-06-29 DIAGNOSIS — M54.16 LUMBAR RADICULOPATHY: Primary | ICD-10-CM

## 2023-07-11 ENCOUNTER — OFFICE VISIT (OUTPATIENT)
Dept: ORTHOPEDIC SURGERY | Age: 73
End: 2023-07-11
Payer: MEDICARE

## 2023-07-11 VITALS — HEIGHT: 61 IN | BODY MASS INDEX: 27.56 KG/M2 | WEIGHT: 146 LBS

## 2023-07-11 DIAGNOSIS — M54.16 LUMBAR RADICULOPATHY: Primary | ICD-10-CM

## 2023-07-11 DIAGNOSIS — M54.50 LUMBAR BACK PAIN: ICD-10-CM

## 2023-07-11 PROCEDURE — 62323 NJX INTERLAMINAR LMBR/SAC: CPT | Performed by: PHYSICAL MEDICINE & REHABILITATION

## 2023-07-11 PROCEDURE — 20552 NJX 1/MLT TRIGGER POINT 1/2: CPT | Performed by: PHYSICAL MEDICINE & REHABILITATION

## 2023-07-11 RX ORDER — TRIAMCINOLONE ACETONIDE 40 MG/ML
220 INJECTION, SUSPENSION INTRA-ARTICULAR; INTRAMUSCULAR ONCE
Status: COMPLETED | OUTPATIENT
Start: 2023-07-11 | End: 2023-07-11

## 2023-07-11 RX ADMIN — TRIAMCINOLONE ACETONIDE 220 MG: 40 INJECTION, SUSPENSION INTRA-ARTICULAR; INTRAMUSCULAR at 09:13

## 2023-07-11 NOTE — PROGRESS NOTES
Date: 07/11/23   Name: Amita Garvin [facility]    Pre-Procedural Diagnosis:    Diagnosis Orders   1. Lumbar radiculopathy  XR INJ SPINE THER SUBST LUM/SAC W IMG    triamcinolone acetonide (KENALOG-40) injection 220 mg      2. Lumbar back pain  INJECT TRIGGER POINT, 1 OR 2    triamcinolone acetonide (KENALOG-40) injection 220 mg          Procedure: Lumbar Epidural Steroid Injection (LYNDA) with Trigger Point Injection(s)    Precautions: [facility] Precautions spine injections: None. Patient denies any prior sensitivity to steroid, local anesthetic, contrast dye, iodine or shellfish. The procedure was discussed at length with the patient and informed consent was signed. The patient was placed in a prone position on the fluoroscopy table and the skin was prepped and draped in a routine sterile fashion. The areas to be injected were each anesthetized with approximately 5 cc of 1% Lidocaine. Initially a 22-gauge 3.5 inch spinal needle was carefully advanced under fluoroscopic guidance to the left L5-S1 interlaminar epidural space. At this time 0.25 cc of omnipaque administered. . Once proper placement was confirmed, 1.5 cc of sterile water and 100 mg of Kenalog were injected through the spinal needle. After informed oral consent, patient was prepped per routine. The bilateral lumbar paraspinal area(s) were injected. 60 mg of Kenalog with 1 cc of 0.25% Marcaine injected at each site. Patient tolerated well. Band aid(s) applied. Fluoroscopic guidance was used intermittently over a 10-minute period to insure proper needle placement and patient safety. A hard copy of the fluoroscopic  images has been placed in the patient's chart. The patient was monitored after the procedure and discharged home without complication. A total of two muscles/sites injected today for trigger point charge. A total of 5.5 cc of Kenalog were administered during this procedure.     Resume Meds:  N/A    Sherman Edouard MD  07/11/23

## 2023-07-31 ENCOUNTER — OFFICE VISIT (OUTPATIENT)
Dept: ORTHOPEDIC SURGERY | Age: 73
End: 2023-07-31
Payer: MEDICARE

## 2023-07-31 DIAGNOSIS — Z96.652 STATUS POST LEFT KNEE REPLACEMENT: Primary | ICD-10-CM

## 2023-07-31 PROCEDURE — 1090F PRES/ABSN URINE INCON ASSESS: CPT | Performed by: PHYSICIAN ASSISTANT

## 2023-07-31 PROCEDURE — 3017F COLORECTAL CA SCREEN DOC REV: CPT | Performed by: ORTHOPAEDIC SURGERY

## 2023-07-31 PROCEDURE — G8417 CALC BMI ABV UP PARAM F/U: HCPCS | Performed by: PHYSICIAN ASSISTANT

## 2023-07-31 PROCEDURE — G8428 CUR MEDS NOT DOCUMENT: HCPCS | Performed by: PHYSICIAN ASSISTANT

## 2023-07-31 PROCEDURE — 99213 OFFICE O/P EST LOW 20 MIN: CPT | Performed by: PHYSICIAN ASSISTANT

## 2023-07-31 PROCEDURE — G8399 PT W/DXA RESULTS DOCUMENT: HCPCS | Performed by: PHYSICIAN ASSISTANT

## 2023-07-31 PROCEDURE — 1036F TOBACCO NON-USER: CPT | Performed by: ORTHOPAEDIC SURGERY

## 2023-07-31 PROCEDURE — 1123F ACP DISCUSS/DSCN MKR DOCD: CPT | Performed by: PHYSICIAN ASSISTANT

## 2023-07-31 NOTE — PROGRESS NOTES
no limp  Incision: well healed    Radiographs: AP/Lateral and sunrise of the left knee taken in the office today reveal a good bone, prosthetic appearance. The patella is balanced. Radiographic Diagnosis:  Stable total knee arthroplasty. Impression: Status post total knee arthroplasty    Recommendations:  Reviewed x-ray findings with the patient. Activities to be advanced as tolerated. Normal lifetime restrictions as discussed. Appropriate activities for strengthening and conditioning were reviewed. Return appointment in 6 months for follow up and x-rays. Approximately 20 minutes was spent reviewing the medical record, imaging, performing history and physical examination, discussing the diagnosis and treatment plan with the patient, and completing documentation for this visit.

## 2023-08-30 DIAGNOSIS — M54.16 LUMBAR RADICULOPATHY: Primary | ICD-10-CM

## 2023-08-30 RX ORDER — TRAMADOL HYDROCHLORIDE 50 MG/1
50 TABLET ORAL 2 TIMES DAILY PRN
Qty: 60 TABLET | Refills: 2 | Status: SHIPPED | OUTPATIENT
Start: 2023-08-30 | End: 2023-11-28

## 2023-09-29 ENCOUNTER — OFFICE VISIT (OUTPATIENT)
Dept: FAMILY MEDICINE CLINIC | Facility: CLINIC | Age: 73
End: 2023-09-29
Payer: MEDICARE

## 2023-09-29 VITALS
OXYGEN SATURATION: 97 % | HEIGHT: 61 IN | SYSTOLIC BLOOD PRESSURE: 130 MMHG | BODY MASS INDEX: 28.32 KG/M2 | HEART RATE: 98 BPM | DIASTOLIC BLOOD PRESSURE: 70 MMHG | WEIGHT: 150 LBS

## 2023-09-29 DIAGNOSIS — F11.20 OPIOID DEPENDENCE WITH CURRENT USE (HCC): ICD-10-CM

## 2023-09-29 DIAGNOSIS — I87.2 EDEMA OF BOTH LOWER EXTREMITIES DUE TO PERIPHERAL VENOUS INSUFFICIENCY: Primary | ICD-10-CM

## 2023-09-29 DIAGNOSIS — Z98.890 HISTORY OF ESOPHAGEAL DILATATION: ICD-10-CM

## 2023-09-29 DIAGNOSIS — Z23 NEEDS FLU SHOT: ICD-10-CM

## 2023-09-29 DIAGNOSIS — K21.9 GASTROESOPHAGEAL REFLUX DISEASE WITHOUT ESOPHAGITIS: Chronic | ICD-10-CM

## 2023-09-29 PROCEDURE — 99214 OFFICE O/P EST MOD 30 MIN: CPT | Performed by: FAMILY MEDICINE

## 2023-09-29 PROCEDURE — G8399 PT W/DXA RESULTS DOCUMENT: HCPCS | Performed by: FAMILY MEDICINE

## 2023-09-29 PROCEDURE — 1090F PRES/ABSN URINE INCON ASSESS: CPT | Performed by: FAMILY MEDICINE

## 2023-09-29 PROCEDURE — G8427 DOCREV CUR MEDS BY ELIG CLIN: HCPCS | Performed by: FAMILY MEDICINE

## 2023-09-29 PROCEDURE — G0008 ADMIN INFLUENZA VIRUS VAC: HCPCS | Performed by: FAMILY MEDICINE

## 2023-09-29 PROCEDURE — 3078F DIAST BP <80 MM HG: CPT | Performed by: FAMILY MEDICINE

## 2023-09-29 PROCEDURE — 1036F TOBACCO NON-USER: CPT | Performed by: FAMILY MEDICINE

## 2023-09-29 PROCEDURE — 90694 VACC AIIV4 NO PRSRV 0.5ML IM: CPT | Performed by: FAMILY MEDICINE

## 2023-09-29 PROCEDURE — 1123F ACP DISCUSS/DSCN MKR DOCD: CPT | Performed by: FAMILY MEDICINE

## 2023-09-29 PROCEDURE — G8417 CALC BMI ABV UP PARAM F/U: HCPCS | Performed by: FAMILY MEDICINE

## 2023-09-29 PROCEDURE — 3074F SYST BP LT 130 MM HG: CPT | Performed by: FAMILY MEDICINE

## 2023-09-29 PROCEDURE — 3017F COLORECTAL CA SCREEN DOC REV: CPT | Performed by: FAMILY MEDICINE

## 2023-09-29 NOTE — PROGRESS NOTES
Marc Primrose, DO                Diplomate of the American Osteopathic Board of [facility] [employer]         [phone]    Judy Raymundo is a 68 y.o. female who was seen on 9/29/2023 for   Chief Complaint   Patient presents with    Foot Swelling     Bilateral  sx x 2 weeks    Gastroesophageal Reflux     Having gas, belching food    Flank Pain     Right side    Other     Requesting flu vaccine       Assessment & Plan     Diagnosis Orders   1. Edema of both lower extremities due to peripheral venous insufficiency      Limit salt. Elevate legs when possible. Overall weight loss would also help. 2. History of esophageal dilatation  External Referral To Gastroenterology    Ref GAstro      3. Gastroesophageal reflux disease without esophagitis        4. Opioid dependence with current use ([address])        5. Needs flu shot  Influenza, FLUAD, (age 72 y+), IM, Preservative Free, 0.5 mL              On this date 09/29/2023 I have spent 31 minutes reviewing previous notes, lab/imaging results and face to face with the patient discussing the diagnoses and importance of compliance with the treatment plan, as well as documenting on the day of the visit. RECENT LABS/TESTS TO REVIEW and DISCUSS    No results found for this visit on 09/29/23. Subjective    HPI:     This is a 78-year-old female patient here today with multiple complaints. The chief complaint among these is bilateral feet swelling over the past 2 weeks. She denies any change in diet but does admit to eating a fair amount of salty foods. I advised her to cut down on salt and drink plenty of fluids. If she does not continue to have improvement I will prescribe a diuretic. Patient admits to a lot of gas and belching recently. She is having difficulty with swallowing. She would like a referral to a GI doctor which I placed today.     Flu shot was given today    Especially feet

## 2023-10-02 ENCOUNTER — TELEPHONE (OUTPATIENT)
Dept: FAMILY MEDICINE CLINIC | Facility: CLINIC | Age: 73
End: 2023-10-02

## 2023-10-02 NOTE — TELEPHONE ENCOUNTER
Returned call, spoke with patient, gave condition update, stated is feeling better, still with GERD, had BM Saturday and again today, stated FYI for Dr Ginny Alcantar

## 2023-10-11 ASSESSMENT — ENCOUNTER SYMPTOMS
BACK PAIN: 0
SORE THROAT: 0
DIARRHEA: 0
VOMITING: 0
ABDOMINAL PAIN: 0
EYE PAIN: 0
SHORTNESS OF BREATH: 0
CONSTIPATION: 0
COUGH: 0
ABDOMINAL DISTENTION: 1
WHEEZING: 0

## 2023-12-14 ENCOUNTER — OFFICE VISIT (OUTPATIENT)
Dept: ORTHOPEDIC SURGERY | Age: 73
End: 2023-12-14
Payer: MEDICARE

## 2023-12-14 VITALS — WEIGHT: 148 LBS | HEIGHT: 61 IN | BODY MASS INDEX: 27.94 KG/M2

## 2023-12-14 DIAGNOSIS — M60.9 MYOSITIS, UNSPECIFIED MYOSITIS TYPE, UNSPECIFIED SITE: ICD-10-CM

## 2023-12-14 DIAGNOSIS — M54.50 LUMBAR BACK PAIN: Primary | ICD-10-CM

## 2023-12-14 DIAGNOSIS — M48.061 SPINAL STENOSIS OF LUMBAR REGION WITHOUT NEUROGENIC CLAUDICATION: ICD-10-CM

## 2023-12-14 DIAGNOSIS — M54.16 LUMBAR RADICULOPATHY: ICD-10-CM

## 2023-12-14 PROCEDURE — 1036F TOBACCO NON-USER: CPT | Performed by: PHYSICAL MEDICINE & REHABILITATION

## 2023-12-14 PROCEDURE — G8399 PT W/DXA RESULTS DOCUMENT: HCPCS | Performed by: PHYSICAL MEDICINE & REHABILITATION

## 2023-12-14 PROCEDURE — 99213 OFFICE O/P EST LOW 20 MIN: CPT | Performed by: PHYSICAL MEDICINE & REHABILITATION

## 2023-12-14 PROCEDURE — G8427 DOCREV CUR MEDS BY ELIG CLIN: HCPCS | Performed by: PHYSICAL MEDICINE & REHABILITATION

## 2023-12-14 PROCEDURE — 3017F COLORECTAL CA SCREEN DOC REV: CPT | Performed by: PHYSICAL MEDICINE & REHABILITATION

## 2023-12-14 PROCEDURE — 1123F ACP DISCUSS/DSCN MKR DOCD: CPT | Performed by: PHYSICAL MEDICINE & REHABILITATION

## 2023-12-14 PROCEDURE — G8417 CALC BMI ABV UP PARAM F/U: HCPCS | Performed by: PHYSICAL MEDICINE & REHABILITATION

## 2023-12-14 PROCEDURE — 1090F PRES/ABSN URINE INCON ASSESS: CPT | Performed by: PHYSICAL MEDICINE & REHABILITATION

## 2023-12-14 PROCEDURE — G8484 FLU IMMUNIZE NO ADMIN: HCPCS | Performed by: PHYSICAL MEDICINE & REHABILITATION

## 2023-12-14 RX ORDER — TRAMADOL HYDROCHLORIDE 50 MG/1
50 TABLET ORAL 2 TIMES DAILY PRN
Qty: 60 TABLET | Refills: 5 | Status: SHIPPED | OUTPATIENT
Start: 2023-12-14 | End: 2024-01-13

## 2023-12-14 NOTE — PROGRESS NOTES
Date:  12/14/23     HPI:  I am seeing Lindanclizabeth Dong in evalution/folowup. I saw her most recently 6 months ago. She receives tramadol from me up to twice a day with no particular side effects. She does need refills today. She has pain in the lower lumbar paraspinal areas that may gravitate to the buttocks. There may go either knee posteriorly, no neurologic issues. She does have significant lumbar spinal stenosis from prior imaging as recently as 2014 but she ambulates well and has no history of a progressive gait disturbance. When the pain gets to the point she needs reinjection, she does very well with translaminar epidural steroid injections, 80% pain reduction for 6 months at times. The last set of injections were 5 months ago she is doing very well with them. When the pain gets to the point she wants reinjection, is typically 8/10 in severity. At that time she has trouble bathing, cleaning, dressing, doing household activities, getting into or out of a car. She performs provider read lumbar spine exercises with regularity. ROS: No new medical issues    PE: Alert and cooperative. Good strength lower extremities. Ambulates without assistance. No lumbar spine tenderness to palpation. Assessment/Plan:       PREDOMINANTLY MUSCULOSKELETAL LUMBOSACRAL  SPINE PAIN. In the context of significant spinal stenosis in the lumbar area, she is doing very well from a neurologic/gait standpoint. We do not need to reimage. Her track record with spine injections is good and we can repeat those as required. Nothing to do today, follow-up 6 months for continuity of care. Refills given of tramadol.     2.          Luis Keys MD

## 2024-01-11 ENCOUNTER — HOSPITAL ENCOUNTER (OUTPATIENT)
Dept: CT IMAGING | Age: 74
Discharge: HOME | End: 2024-01-11
Attending: INTERNAL MEDICINE
Payer: MEDICARE

## 2024-01-11 DIAGNOSIS — R10.11 ABDOMINAL PAIN, RUQ: ICD-10-CM

## 2024-01-11 LAB — CREAT BLD-MCNC: 0.7 MG/DL (ref 0.8–1.5)

## 2024-01-11 PROCEDURE — 6360000004 HC RX CONTRAST MEDICATION: Performed by: INTERNAL MEDICINE

## 2024-01-11 PROCEDURE — 82565 ASSAY OF CREATININE: CPT

## 2024-01-11 PROCEDURE — 74177 CT ABD & PELVIS W/CONTRAST: CPT

## 2024-01-11 PROCEDURE — 74177 CT ABD & PELVIS W/CONTRAST: CPT | Performed by: RADIOLOGY

## 2024-01-11 RX ADMIN — IOPAMIDOL 100 ML: 755 INJECTION, SOLUTION INTRAVENOUS at 10:22

## 2024-01-11 RX ADMIN — DIATRIZOATE MEGLUMINE AND DIATRIZOATE SODIUM 15 ML: 660; 100 LIQUID ORAL; RECTAL at 10:22

## 2024-01-17 SDOH — HEALTH STABILITY: PHYSICAL HEALTH: ON AVERAGE, HOW MANY DAYS PER WEEK DO YOU ENGAGE IN MODERATE TO STRENUOUS EXERCISE (LIKE A BRISK WALK)?: 0 DAYS

## 2024-01-19 ENCOUNTER — OFFICE VISIT (OUTPATIENT)
Dept: FAMILY MEDICINE CLINIC | Facility: CLINIC | Age: 74
End: 2024-01-19

## 2024-01-19 VITALS
DIASTOLIC BLOOD PRESSURE: 80 MMHG | BODY MASS INDEX: 28.92 KG/M2 | HEIGHT: 61 IN | RESPIRATION RATE: 16 BRPM | SYSTOLIC BLOOD PRESSURE: 128 MMHG | WEIGHT: 153.2 LBS

## 2024-01-19 DIAGNOSIS — F33.1 MAJOR DEPRESSIVE DISORDER, RECURRENT, MODERATE (HCC): ICD-10-CM

## 2024-01-19 DIAGNOSIS — Z87.891 PERSONAL HISTORY OF TOBACCO USE: ICD-10-CM

## 2024-01-19 DIAGNOSIS — E78.2 MIXED HYPERLIPIDEMIA: ICD-10-CM

## 2024-01-19 DIAGNOSIS — R73.03 PREDIABETES: ICD-10-CM

## 2024-01-19 DIAGNOSIS — Z00.00 MEDICARE ANNUAL WELLNESS VISIT, SUBSEQUENT: Primary | ICD-10-CM

## 2024-01-19 PROBLEM — I77.4 MEDIAN ARCUATE LIGAMENT SYNDROME (HCC): Status: ACTIVE | Noted: 2024-01-19

## 2024-01-19 PROBLEM — R10.11 ABDOMINAL PAIN, RUQ: Status: ACTIVE | Noted: 2024-01-19

## 2024-01-19 PROBLEM — R13.19 ESOPHAGEAL DYSPHAGIA: Status: ACTIVE | Noted: 2024-01-19

## 2024-01-19 RX ORDER — TRAMADOL HYDROCHLORIDE 50 MG/1
50 TABLET ORAL
COMMUNITY
Start: 2024-01-13

## 2024-01-19 ASSESSMENT — LIFESTYLE VARIABLES
HOW OFTEN DO YOU HAVE A DRINK CONTAINING ALCOHOL: NEVER
HOW MANY STANDARD DRINKS CONTAINING ALCOHOL DO YOU HAVE ON A TYPICAL DAY: PATIENT DOES NOT DRINK

## 2024-01-19 ASSESSMENT — PATIENT HEALTH QUESTIONNAIRE - PHQ9
4. FEELING TIRED OR HAVING LITTLE ENERGY: 3
8. MOVING OR SPEAKING SO SLOWLY THAT OTHER PEOPLE COULD HAVE NOTICED. OR THE OPPOSITE, BEING SO FIGETY OR RESTLESS THAT YOU HAVE BEEN MOVING AROUND A LOT MORE THAN USUAL: 0
10. IF YOU CHECKED OFF ANY PROBLEMS, HOW DIFFICULT HAVE THESE PROBLEMS MADE IT FOR YOU TO DO YOUR WORK, TAKE CARE OF THINGS AT HOME, OR GET ALONG WITH OTHER PEOPLE: 1
1. LITTLE INTEREST OR PLEASURE IN DOING THINGS: 0
5. POOR APPETITE OR OVEREATING: 2
SUM OF ALL RESPONSES TO PHQ QUESTIONS 1-9: 11
6. FEELING BAD ABOUT YOURSELF - OR THAT YOU ARE A FAILURE OR HAVE LET YOURSELF OR YOUR FAMILY DOWN: 1
SUM OF ALL RESPONSES TO PHQ QUESTIONS 1-9: 11
SUM OF ALL RESPONSES TO PHQ QUESTIONS 1-9: 11
3. TROUBLE FALLING OR STAYING ASLEEP: 3
2. FEELING DOWN, DEPRESSED OR HOPELESS: 2
9. THOUGHTS THAT YOU WOULD BE BETTER OFF DEAD, OR OF HURTING YOURSELF: 0
SUM OF ALL RESPONSES TO PHQ9 QUESTIONS 1 & 2: 2
SUM OF ALL RESPONSES TO PHQ QUESTIONS 1-9: 11
7. TROUBLE CONCENTRATING ON THINGS, SUCH AS READING THE NEWSPAPER OR WATCHING TELEVISION: 0

## 2024-01-19 NOTE — PROGRESS NOTES
Medicare Annual Wellness Visit    Dorothy Bardales is here for New Patient (Is waiting on surgeon's phone call. Has a lump on right side. Had an MRI done. Needs surgery. //Gaps: CT Lung ) and Medicare AWV (Subsequent )    Assessment & Plan   Medicare annual wellness visit, subsequent  Personal history of tobacco use  -     NE VISIT TO DISCUSS LUNG CA SCREEN W LDCT  Prediabetes  -     Lipid Panel; Future  -     Comprehensive Metabolic Panel; Future  -     Hemoglobin A1C; Future  Mixed hyperlipidemia  Major depressive disorder, recurrent, moderate (HCC)  Recommendations for Preventive Services Due: see orders and patient instructions/AVS.  Recommended screening schedule for the next 5-10 years is provided to the patient in written form: see Patient Instructions/AVS.     Return in about 1 day (around 1/20/2024) for labs, then OV in 1 year with labs prior .     Subjective   Prediabetes - not on medication.   HLD - On Lipitor 20 mg daily.   Lab Results   Component Value Date    CHOL 171 01/23/2024    CHOL 145 12/02/2022    CHOL 162 12/02/2021     Lab Results   Component Value Date    TRIG 53 01/23/2024    TRIG 44 12/02/2022    TRIG 46 12/02/2021     Lab Results   Component Value Date    HDL 89 (H) 01/23/2024    HDL 88 (H) 12/02/2022    HDL 84 12/02/2021     Lab Results   Component Value Date    LDLCALC 71.4 01/23/2024    LDLCALC 48.2 12/02/2022    LDLCALC 68 12/02/2021     Lab Results   Component Value Date    VLDL 10 12/02/2021    VLDL 12 11/17/2020     Lab Results   Component Value Date    CHOLHDLRATIO 1.9 01/23/2024    CHOLHDLRATIO 1.6 12/02/2022     GERD - controlled on Prevacid.   Depression - controlled on Cymbalta 90 mg daily.     Patient's complete Health Risk Assessment and screening values have been reviewed and are found in Flowsheets. The following problems were reviewed today and where indicated follow up appointments were made and/or referrals ordered.    Positive Risk Factor Screenings with

## 2024-01-19 NOTE — PATIENT INSTRUCTIONS
screenings are paid in full while other may be subject to a deductible, co-insurance, and/or copay.    Some of these benefits include a comprehensive review of your medical history including lifestyle, illnesses that may run in your family, and various assessments and screenings as appropriate.    After reviewing your medical record and screening and assessments performed today your provider may have ordered immunizations, labs, imaging, and/or referrals for you.  A list of these orders (if applicable) as well as your Preventive Care list are included within your After Visit Summary for your review.    Other Preventive Recommendations:    A preventive eye exam performed by an eye specialist is recommended every 1-2 years to screen for glaucoma; cataracts, macular degeneration, and other eye disorders.  A preventive dental visit is recommended every 6 months.  Try to get at least 150 minutes of exercise per week or 10,000 steps per day on a pedometer .  Order or download the FREE \"Exercise & Physical Activity: Your Everyday Guide\" from [employer] [place] [employer]. Call 1-353.143.7464 or search [employer] [place] [employer] online.  You need 0730-1715 mg of calcium and 7403-3158 IU of vitamin D per day. It is possible to meet your calcium requirement with diet alone, but a vitamin D supplement is usually necessary to meet this goal.  When exposed to the sun, use a sunscreen that protects against both UVA and UVB radiation with an SPF of 30 or greater. Reapply every 2 to 3 hours or after sweating, drying off with a towel, or swimming.  Always wear a seat belt when traveling in a car. Always wear a helmet when riding a bicycle or motorcycle.

## 2024-01-23 ENCOUNTER — NURSE ONLY (OUTPATIENT)
Dept: FAMILY MEDICINE CLINIC | Facility: CLINIC | Age: 74
End: 2024-01-23

## 2024-01-23 DIAGNOSIS — R73.03 PREDIABETES: ICD-10-CM

## 2024-01-23 LAB
ALBUMIN SERPL-MCNC: 3.8 G/DL (ref 3.2–4.6)
ALBUMIN/GLOB SERPL: 1.4 (ref 0.4–1.6)
ALP SERPL-CCNC: 82 U/L (ref 50–136)
ALT SERPL-CCNC: 17 U/L (ref 12–65)
ANION GAP SERPL CALC-SCNC: 3 MMOL/L (ref 2–11)
AST SERPL-CCNC: 18 U/L (ref 15–37)
BILIRUB SERPL-MCNC: 0.4 MG/DL (ref 0.2–1.1)
BUN SERPL-MCNC: 18 MG/DL (ref 8–23)
CALCIUM SERPL-MCNC: 9.2 MG/DL (ref 8.3–10.4)
CHLORIDE SERPL-SCNC: 102 MMOL/L (ref 103–113)
CHOLEST SERPL-MCNC: 171 MG/DL
CO2 SERPL-SCNC: 33 MMOL/L (ref 21–32)
CREAT SERPL-MCNC: 0.9 MG/DL (ref 0.6–1)
GLOBULIN SER CALC-MCNC: 2.7 G/DL (ref 2.8–4.5)
GLUCOSE SERPL-MCNC: 103 MG/DL (ref 65–100)
HDLC SERPL-MCNC: 89 MG/DL (ref 40–60)
HDLC SERPL: 1.9
LDLC SERPL CALC-MCNC: 71.4 MG/DL
POTASSIUM SERPL-SCNC: 4 MMOL/L (ref 3.5–5.1)
PROT SERPL-MCNC: 6.5 G/DL (ref 6.3–8.2)
SODIUM SERPL-SCNC: 138 MMOL/L (ref 136–146)
TRIGL SERPL-MCNC: 53 MG/DL (ref 35–150)
VLDLC SERPL CALC-MCNC: 10.6 MG/DL (ref 6–23)

## 2024-01-24 LAB
EST. AVERAGE GLUCOSE BLD GHB EST-MCNC: 123 MG/DL
HBA1C MFR BLD: 5.9 % (ref 4.8–5.6)

## 2024-01-31 ENCOUNTER — TELEPHONE (OUTPATIENT)
Dept: ORTHOPEDIC SURGERY | Age: 74
End: 2024-01-31

## 2024-01-31 DIAGNOSIS — M54.16 LUMBAR RADICULOPATHY: Primary | ICD-10-CM

## 2024-01-31 NOTE — TELEPHONE ENCOUNTER
Appointment Request  (Newest Message First)  Dorothy Bardales  [facility]  Staff2 hours ago (8:06 AM)       Appointment Request From: Dorothy Bardales     With Provider: RIVER LINDA JR, MD [[facility]]     Preferred Date Range: Any     Preferred Times: Any Time     Reason for visit: Request an Appointment     Comments:  Need to get shots for my back

## 2024-01-31 NOTE — TELEPHONE ENCOUNTER
Called patient confirmed same as previous injections - LYNDA with Trigger injections.    Pre cert sent will call patient and schedule once approved . Patient in agreement

## 2024-02-04 PROBLEM — F33.1 MAJOR DEPRESSIVE DISORDER, RECURRENT, MODERATE (HCC): Status: ACTIVE | Noted: 2024-02-04

## 2024-02-05 ENCOUNTER — OFFICE VISIT (OUTPATIENT)
Dept: ORTHOPEDIC SURGERY | Age: 74
End: 2024-02-05
Payer: MEDICARE

## 2024-02-05 DIAGNOSIS — Z96.652 STATUS POST LEFT KNEE REPLACEMENT: Primary | ICD-10-CM

## 2024-02-05 PROCEDURE — 1123F ACP DISCUSS/DSCN MKR DOCD: CPT | Performed by: PHYSICIAN ASSISTANT

## 2024-02-05 PROCEDURE — 99213 OFFICE O/P EST LOW 20 MIN: CPT | Performed by: PHYSICIAN ASSISTANT

## 2024-02-05 PROCEDURE — 1090F PRES/ABSN URINE INCON ASSESS: CPT | Performed by: PHYSICIAN ASSISTANT

## 2024-02-05 PROCEDURE — G8417 CALC BMI ABV UP PARAM F/U: HCPCS | Performed by: PHYSICIAN ASSISTANT

## 2024-02-05 PROCEDURE — 3017F COLORECTAL CA SCREEN DOC REV: CPT | Performed by: PHYSICIAN ASSISTANT

## 2024-02-05 PROCEDURE — G8428 CUR MEDS NOT DOCUMENT: HCPCS | Performed by: PHYSICIAN ASSISTANT

## 2024-02-05 PROCEDURE — G8484 FLU IMMUNIZE NO ADMIN: HCPCS | Performed by: PHYSICIAN ASSISTANT

## 2024-02-05 PROCEDURE — G8399 PT W/DXA RESULTS DOCUMENT: HCPCS | Performed by: PHYSICIAN ASSISTANT

## 2024-02-05 PROCEDURE — 1036F TOBACCO NON-USER: CPT | Performed by: PHYSICIAN ASSISTANT

## 2024-02-05 NOTE — PROGRESS NOTES
valgus  Quadriceps strength: excellent  Gait: no limp  Incision: well healed    Radiographs: AP/Lateral and sunrise of the left knee taken in the office today reveal a good bone, prosthetic appearance.  The patella is balanced.           Radiographic Diagnosis:  Stable left total knee arthroplasty.    Impression: Status post left total knee arthroplasty    Recommendations:  Reviewed x-ray findings with the patient.  Activities to be advanced as tolerated.  Normal lifetime restrictions as discussed.  Appropriate activities for strengthening and conditioning were reviewed.  Return appointment in 2 years for follow up and x-rays.    Approximately 20 minutes was spent reviewing the medical record, imaging, performing history and physical examination, discussing the diagnosis and treatment plan with the patient, and completing documentation for this visit.

## 2024-02-06 ENCOUNTER — OFFICE VISIT (OUTPATIENT)
Dept: ORTHOPEDIC SURGERY | Age: 74
End: 2024-02-06
Payer: MEDICARE

## 2024-02-06 VITALS — WEIGHT: 153 LBS | HEIGHT: 61 IN | BODY MASS INDEX: 28.89 KG/M2

## 2024-02-06 DIAGNOSIS — M54.16 LUMBAR RADICULOPATHY: Primary | ICD-10-CM

## 2024-02-06 DIAGNOSIS — M54.50 LUMBAR BACK PAIN: ICD-10-CM

## 2024-02-06 DIAGNOSIS — M60.9 MYOSITIS, UNSPECIFIED MYOSITIS TYPE, UNSPECIFIED SITE: ICD-10-CM

## 2024-02-06 PROCEDURE — 20552 NJX 1/MLT TRIGGER POINT 1/2: CPT | Performed by: PHYSICAL MEDICINE & REHABILITATION

## 2024-02-06 PROCEDURE — 62323 NJX INTERLAMINAR LMBR/SAC: CPT | Performed by: PHYSICAL MEDICINE & REHABILITATION

## 2024-02-06 RX ORDER — TRIAMCINOLONE ACETONIDE 40 MG/ML
40 INJECTION, SUSPENSION INTRA-ARTICULAR; INTRAMUSCULAR ONCE
Status: COMPLETED | OUTPATIENT
Start: 2024-02-06 | End: 2024-02-06

## 2024-02-06 RX ADMIN — TRIAMCINOLONE ACETONIDE 220 MG: 40 INJECTION, SUSPENSION INTRA-ARTICULAR; INTRAMUSCULAR at 09:37

## 2024-02-06 NOTE — PROGRESS NOTES
Date: 02/06/24   Name: Dorothy Bardales    Pre-Procedural Diagnosis:    Diagnosis Orders   1. Lumbar radiculopathy  XR INJ SPINE THER SUBST LUM/SAC W IMG    INJECT TRIGGER POINT, 1 OR 2      2. Lumbar back pain        3. Myositis, unspecified myositis type, unspecified site            Procedure: Lumbar Epidural Steroid Injection (LYNDA) with Trigger Point Injection(s)    Precautions: [facility] Precautions spine injections: None.  Patient denies any prior sensitivity to steroid, local anesthetic, contrast dye, iodine or shellfish.    The procedure was discussed at length with the patient and informed consent was signed. The patient was placed in a prone position on the fluoroscopy table and the skin was prepped and draped in a routine sterile fashion. The areas to be injected were each anesthetized with approximately 5 cc of 1% Lidocaine. Initially a 22-gauge 3.5 inch spinal needle was carefully advanced under fluoroscopic guidance to the left L5-S1 interlaminar epidural space. At this time 0.25 cc of omnipaque administered.. Once proper placement was confirmed, 1.5 cc of sterile water and 100 mg of Kenalog were injected through the spinal needle.     After informed oral consent, patient was prepped per routine. The bilateral lumbar paraspinal area(s) were injected. 60 mg of Kenalog with 1 cc of 0.25% Marcaine injected at each site. Patient tolerated well. Band aid(s) applied.      Fluoroscopic guidance was used intermittently over a 10-minute period to insure proper needle placement and patient safety. A hard copy of the fluoroscopic  images has been placed in the patient's chart. The patient was monitored after the procedure and discharged home without complication.     A total of two muscles/sites injected today for trigger point charge.    A total of 5.5 cc of Kenalog were administered during this procedure.    Resume Meds:  N/A    L STEVEN LINDA JR, MD  02/06/24

## 2024-02-15 ENCOUNTER — HOSPITAL ENCOUNTER (OUTPATIENT)
Dept: MAMMOGRAPHY | Age: 74
Discharge: HOME | End: 2024-02-15
Attending: FAMILY MEDICINE
Payer: MEDICARE

## 2024-02-15 DIAGNOSIS — Z12.31 ENCOUNTER FOR SCREENING MAMMOGRAM FOR MALIGNANT NEOPLASM OF BREAST: ICD-10-CM

## 2024-02-15 PROCEDURE — 77063 BREAST TOMOSYNTHESIS BI: CPT

## 2024-03-12 DIAGNOSIS — F33.1 MAJOR DEPRESSIVE DISORDER, RECURRENT, MODERATE (HCC): ICD-10-CM

## 2024-03-12 DIAGNOSIS — E78.00 PURE HYPERCHOLESTEROLEMIA, UNSPECIFIED: ICD-10-CM

## 2024-03-12 NOTE — TELEPHONE ENCOUNTER
Patient called requesting a refill on her DULoxetine (CYMBALTA) 30 MG extended release capsule, DULoxetine (CYMBALTA) 60 MG extended release capsule and atorvastatin (LIPITOR) 20 MG tablet. Please Advise.       [facility]

## 2024-03-13 RX ORDER — ATORVASTATIN CALCIUM 20 MG/1
20 TABLET, FILM COATED ORAL DAILY
Qty: 90 TABLET | Refills: 1 | Status: SHIPPED | OUTPATIENT
Start: 2024-03-13

## 2024-03-13 RX ORDER — DULOXETIN HYDROCHLORIDE 60 MG/1
60 CAPSULE, DELAYED RELEASE ORAL DAILY
Qty: 90 CAPSULE | Refills: 1 | Status: SHIPPED | OUTPATIENT
Start: 2024-03-13

## 2024-03-13 RX ORDER — DULOXETIN HYDROCHLORIDE 30 MG/1
30 CAPSULE, DELAYED RELEASE ORAL DAILY
Qty: 90 CAPSULE | Refills: 1 | Status: SHIPPED | OUTPATIENT
Start: 2024-03-13

## 2024-04-24 ENCOUNTER — TELEPHONE (OUTPATIENT)
Dept: ORTHOPEDIC SURGERY | Age: 74
End: 2024-04-24

## 2024-04-24 DIAGNOSIS — M54.16 LUMBAR RADICULOPATHY: Primary | ICD-10-CM

## 2024-04-24 NOTE — TELEPHONE ENCOUNTER
Appointment Request  (Newest Message First)  Dorothy Bardales  [facility]  Staff2 days ago       Appointment Request From: Dorothy Bardales     With Provider: RIVER LINDA JR, MD [[facility]]     Preferred Date Range: 4/29/2024 - 5/6/2024     Preferred Times: Tuesday Afternoon, Thursday Morning     Reason for visit: Request an Appointment     Comments:  Need injection for my back

## 2024-05-14 ENCOUNTER — TELEPHONE (OUTPATIENT)
Dept: ORTHOPEDIC SURGERY | Age: 74
End: 2024-05-14

## 2024-05-15 ENCOUNTER — TELEPHONE (OUTPATIENT)
Dept: ORTHOPEDIC SURGERY | Age: 74
End: 2024-05-15

## 2024-05-15 NOTE — TELEPHONE ENCOUNTER
Pt called said the she rec a letter from her [employer].that dr vazquez dismiss this inj.

## 2024-05-20 ENCOUNTER — TELEPHONE (OUTPATIENT)
Dept: ORTHOPEDIC SURGERY | Age: 74
End: 2024-05-20

## 2024-05-21 ENCOUNTER — TELEPHONE (OUTPATIENT)
Dept: ORTHOPEDIC SURGERY | Age: 74
End: 2024-05-21

## 2024-05-21 NOTE — TELEPHONE ENCOUNTER
Called and scheduled patient for repeat injections with [facility] 5/24 GR 11:00am

## 2024-05-24 ENCOUNTER — OFFICE VISIT (OUTPATIENT)
Dept: ORTHOPEDIC SURGERY | Age: 74
End: 2024-05-24

## 2024-05-24 DIAGNOSIS — M54.50 LUMBAR BACK PAIN: ICD-10-CM

## 2024-05-24 DIAGNOSIS — M79.10 MYALGIA: ICD-10-CM

## 2024-05-24 DIAGNOSIS — M54.16 LUMBAR RADICULOPATHY: Primary | ICD-10-CM

## 2024-05-24 RX ORDER — TRIAMCINOLONE ACETONIDE 40 MG/ML
220 INJECTION, SUSPENSION INTRA-ARTICULAR; INTRAMUSCULAR ONCE
Status: COMPLETED | OUTPATIENT
Start: 2024-05-24 | End: 2024-05-24

## 2024-05-24 RX ADMIN — TRIAMCINOLONE ACETONIDE 220 MG: 40 INJECTION, SUSPENSION INTRA-ARTICULAR; INTRAMUSCULAR at 11:43

## 2024-05-24 NOTE — PROGRESS NOTES
Date: 05/24/24   Name: Dorothy Bardales    Pre-Procedural Diagnosis:    Diagnosis Orders   1. Lumbar radiculopathy  XR INJ SPINE THER SUBST LUM/SAC W IMG    triamcinolone acetonide (KENALOG-40) injection 220 mg      2. Lumbar back pain  XR INJ SPINE THER SUBST LUM/SAC W IMG    INJECT TRIGGER POINT, 1 OR 2    triamcinolone acetonide (KENALOG-40) injection 220 mg      3. Myalgia  INJECT TRIGGER POINT, 1 OR 2    triamcinolone acetonide (KENALOG-40) injection 220 mg          Procedure: Lumbar Epidural Steroid Injection (LYNDA) with Trigger Point Injection(s)    Precautions: [facility] Precautions spine injections: None.  Patient denies any prior sensitivity to steroid, local anesthetic, contrast dye, iodine or shellfish.    The procedure was discussed at length with the patient and informed consent was signed. The patient was placed in a prone position on the fluoroscopy table and the skin was prepped and draped in a routine sterile fashion. The areas to be injected were each anesthetized with approximately 5 cc of 1% Lidocaine. Initially a 22-gauge 3.5 inch spinal needle was carefully advanced under fluoroscopic guidance to the left L5-S1 interlaminar epidural space. At this time 0.25 cc of omnipaque administered.. Once proper placement was confirmed, 1.5 cc of sterile water and 100 mg of Kenalog were injected through the spinal needle.     After informed oral consent, patient was prepped per routine. The bilateral lumbar paraspinal area(s) were injected. 60 mg of Kenalog with 1 cc of 0.25% Marcaine injected at each site. Patient tolerated well. Band aid(s) applied.      Fluoroscopic guidance was used intermittently over a 10-minute period to insure proper needle placement and patient safety. A hard copy of the fluoroscopic  images has been placed in the patient's chart. The patient was monitored after the procedure and discharged home without complication.     A total of two muscles/sites injected today for trigger point

## 2024-06-20 ENCOUNTER — OFFICE VISIT (OUTPATIENT)
Dept: ORTHOPEDIC SURGERY | Age: 74
End: 2024-06-20
Payer: MEDICARE

## 2024-06-20 DIAGNOSIS — M54.16 LUMBAR RADICULOPATHY: Primary | ICD-10-CM

## 2024-06-20 DIAGNOSIS — M54.50 LUMBAR BACK PAIN: ICD-10-CM

## 2024-06-20 DIAGNOSIS — M79.10 MYALGIA, UNSPECIFIED SITE: ICD-10-CM

## 2024-06-20 DIAGNOSIS — M48.061 SPINAL STENOSIS OF LUMBAR REGION, UNSPECIFIED WHETHER NEUROGENIC CLAUDICATION PRESENT: ICD-10-CM

## 2024-06-20 PROCEDURE — 99213 OFFICE O/P EST LOW 20 MIN: CPT | Performed by: PHYSICAL MEDICINE & REHABILITATION

## 2024-06-20 PROCEDURE — 3017F COLORECTAL CA SCREEN DOC REV: CPT | Performed by: PHYSICAL MEDICINE & REHABILITATION

## 2024-06-20 PROCEDURE — 1123F ACP DISCUSS/DSCN MKR DOCD: CPT | Performed by: PHYSICAL MEDICINE & REHABILITATION

## 2024-06-20 PROCEDURE — 1036F TOBACCO NON-USER: CPT | Performed by: PHYSICAL MEDICINE & REHABILITATION

## 2024-06-20 PROCEDURE — 1090F PRES/ABSN URINE INCON ASSESS: CPT | Performed by: PHYSICAL MEDICINE & REHABILITATION

## 2024-06-20 PROCEDURE — G8417 CALC BMI ABV UP PARAM F/U: HCPCS | Performed by: PHYSICAL MEDICINE & REHABILITATION

## 2024-06-20 PROCEDURE — G8399 PT W/DXA RESULTS DOCUMENT: HCPCS | Performed by: PHYSICAL MEDICINE & REHABILITATION

## 2024-06-20 PROCEDURE — G8428 CUR MEDS NOT DOCUMENT: HCPCS | Performed by: PHYSICAL MEDICINE & REHABILITATION

## 2024-06-20 RX ORDER — TRAMADOL HYDROCHLORIDE 50 MG/1
50 TABLET ORAL 2 TIMES DAILY PRN
Qty: 60 TABLET | Refills: 5 | Status: SHIPPED | OUTPATIENT
Start: 2024-06-20 | End: 2024-12-17

## 2024-06-20 NOTE — PROGRESS NOTES
Date:  06/20/24     HPI:  I am seeing Dorothy Bardales in evalution/folowup.  She has pain in the lower lumbar paraspinal areas that may gravitate to the buttocks.  No neurologic issues in the lower extremities.  She has significant lumbar spinal stenosis from prior imaging as recently as 10 years ago.  She ambulates well has no history of progressive gait disturbance.    She takes tramadol twice a day with good tolerability.  She does need refills today.    She has very nice responses to a translaminar lumbar LYNDA with trigger point injections into the lower lumbar areas.  This offers a significant pain reduction of 80% for up to 6 months.  Set of injections in February did not last quite as long she was having some gastrointestinal/esophageal issues that were a big stressor at the time.  Fortunately peers those issues have improved or resolved.    When the pain gets to the point she needs reinjection, her pain scale is 8/10.  She has trouble bathing, cleaning, dressing, getting into out of a car, doing household activities, exercising.  She performs provider directed lumbar spine exercises regularity and has been doing this for at least several years.    ROS: Esophageal issues as above.  She has to avoid anti-inflammatory medications.    PE: Alert and cooperative.  Good strength lower extremities.  No lateralizing sensory findings.  She has no significant lumbar spine tenderness.    Assessment/Plan:       PREDOMINANTLY MUSCULOSKELETAL LUMBOSACRAL  SPINE PAIN.  In the context of significant lumbar spinal stenosis, she is doing very well.  We do not need repeat imaging as she is doing well clinically, neurologically.  Recommend repeat injections as needed and I will see her back in 6 months for continuity of care.  Refills of tramadol given today.      RIVER LINDA JR, MD

## 2024-08-07 DIAGNOSIS — F33.1 MAJOR DEPRESSIVE DISORDER, RECURRENT, MODERATE (HCC): ICD-10-CM

## 2024-08-07 DIAGNOSIS — E78.00 PURE HYPERCHOLESTEROLEMIA, UNSPECIFIED: ICD-10-CM

## 2024-08-09 RX ORDER — DULOXETIN HYDROCHLORIDE 30 MG/1
30 CAPSULE, DELAYED RELEASE ORAL DAILY
Qty: 90 CAPSULE | Refills: 1 | Status: SHIPPED | OUTPATIENT
Start: 2024-08-09

## 2024-08-09 RX ORDER — DULOXETIN HYDROCHLORIDE 60 MG/1
60 CAPSULE, DELAYED RELEASE ORAL DAILY
Qty: 90 CAPSULE | Refills: 1 | Status: SHIPPED | OUTPATIENT
Start: 2024-08-09

## 2024-08-09 RX ORDER — ATORVASTATIN CALCIUM 20 MG/1
20 TABLET, FILM COATED ORAL DAILY
Qty: 90 TABLET | Refills: 1 | Status: SHIPPED | OUTPATIENT
Start: 2024-08-09

## 2024-08-26 ENCOUNTER — OFFICE VISIT (OUTPATIENT)
Dept: FAMILY MEDICINE CLINIC | Facility: CLINIC | Age: 74
End: 2024-08-26
Payer: MEDICARE

## 2024-08-26 VITALS
BODY MASS INDEX: 27.53 KG/M2 | RESPIRATION RATE: 16 BRPM | WEIGHT: 145.8 LBS | DIASTOLIC BLOOD PRESSURE: 78 MMHG | HEIGHT: 61 IN | SYSTOLIC BLOOD PRESSURE: 112 MMHG

## 2024-08-26 DIAGNOSIS — M62.838 MUSCLE SPASMS OF NECK: ICD-10-CM

## 2024-08-26 DIAGNOSIS — F41.9 ANXIETY: Primary | ICD-10-CM

## 2024-08-26 PROCEDURE — 1090F PRES/ABSN URINE INCON ASSESS: CPT | Performed by: FAMILY MEDICINE

## 2024-08-26 PROCEDURE — 1036F TOBACCO NON-USER: CPT | Performed by: FAMILY MEDICINE

## 2024-08-26 PROCEDURE — 1123F ACP DISCUSS/DSCN MKR DOCD: CPT | Performed by: FAMILY MEDICINE

## 2024-08-26 PROCEDURE — G8417 CALC BMI ABV UP PARAM F/U: HCPCS | Performed by: FAMILY MEDICINE

## 2024-08-26 PROCEDURE — G8427 DOCREV CUR MEDS BY ELIG CLIN: HCPCS | Performed by: FAMILY MEDICINE

## 2024-08-26 PROCEDURE — 99214 OFFICE O/P EST MOD 30 MIN: CPT | Performed by: FAMILY MEDICINE

## 2024-08-26 PROCEDURE — G8399 PT W/DXA RESULTS DOCUMENT: HCPCS | Performed by: FAMILY MEDICINE

## 2024-08-26 PROCEDURE — 3078F DIAST BP <80 MM HG: CPT | Performed by: FAMILY MEDICINE

## 2024-08-26 PROCEDURE — 3074F SYST BP LT 130 MM HG: CPT | Performed by: FAMILY MEDICINE

## 2024-08-26 PROCEDURE — 3017F COLORECTAL CA SCREEN DOC REV: CPT | Performed by: FAMILY MEDICINE

## 2024-08-26 RX ORDER — PANTOPRAZOLE SODIUM 40 MG/1
40 TABLET, DELAYED RELEASE ORAL DAILY
COMMUNITY
Start: 2024-04-23

## 2024-08-26 RX ORDER — FAMOTIDINE 40 MG/1
40 TABLET, FILM COATED ORAL NIGHTLY
COMMUNITY
Start: 2024-08-20

## 2024-08-26 RX ORDER — BUSPIRONE HYDROCHLORIDE 5 MG/1
5 TABLET ORAL 2 TIMES DAILY
Qty: 60 TABLET | Refills: 1 | Status: SHIPPED | OUTPATIENT
Start: 2024-08-26 | End: 2024-10-25

## 2024-08-26 SDOH — ECONOMIC STABILITY: FOOD INSECURITY: WITHIN THE PAST 12 MONTHS, THE FOOD YOU BOUGHT JUST DIDN'T LAST AND YOU DIDN'T HAVE MONEY TO GET MORE.: NEVER TRUE

## 2024-08-26 SDOH — ECONOMIC STABILITY: FOOD INSECURITY: WITHIN THE PAST 12 MONTHS, YOU WORRIED THAT YOUR FOOD WOULD RUN OUT BEFORE YOU GOT MONEY TO BUY MORE.: NEVER TRUE

## 2024-08-26 SDOH — ECONOMIC STABILITY: INCOME INSECURITY: HOW HARD IS IT FOR YOU TO PAY FOR THE VERY BASICS LIKE FOOD, HOUSING, MEDICAL CARE, AND HEATING?: NOT HARD AT ALL

## 2024-08-26 ASSESSMENT — PATIENT HEALTH QUESTIONNAIRE - PHQ9
5. POOR APPETITE OR OVEREATING: MORE THAN HALF THE DAYS
7. TROUBLE CONCENTRATING ON THINGS, SUCH AS READING THE NEWSPAPER OR WATCHING TELEVISION: MORE THAN HALF THE DAYS
SUM OF ALL RESPONSES TO PHQ QUESTIONS 1-9: 15
10. IF YOU CHECKED OFF ANY PROBLEMS, HOW DIFFICULT HAVE THESE PROBLEMS MADE IT FOR YOU TO DO YOUR WORK, TAKE CARE OF THINGS AT HOME, OR GET ALONG WITH OTHER PEOPLE: NOT DIFFICULT AT ALL
3. TROUBLE FALLING OR STAYING ASLEEP: NEARLY EVERY DAY
1. LITTLE INTEREST OR PLEASURE IN DOING THINGS: MORE THAN HALF THE DAYS
2. FEELING DOWN, DEPRESSED OR HOPELESS: NEARLY EVERY DAY
9. THOUGHTS THAT YOU WOULD BE BETTER OFF DEAD, OR OF HURTING YOURSELF: NOT AT ALL
SUM OF ALL RESPONSES TO PHQ9 QUESTIONS 1 & 2: 5
SUM OF ALL RESPONSES TO PHQ QUESTIONS 1-9: 15
SUM OF ALL RESPONSES TO PHQ QUESTIONS 1-9: 15
6. FEELING BAD ABOUT YOURSELF - OR THAT YOU ARE A FAILURE OR HAVE LET YOURSELF OR YOUR FAMILY DOWN: NOT AT ALL
8. MOVING OR SPEAKING SO SLOWLY THAT OTHER PEOPLE COULD HAVE NOTICED. OR THE OPPOSITE, BEING SO FIGETY OR RESTLESS THAT YOU HAVE BEEN MOVING AROUND A LOT MORE THAN USUAL: NOT AT ALL
4. FEELING TIRED OR HAVING LITTLE ENERGY: NEARLY EVERY DAY
SUM OF ALL RESPONSES TO PHQ QUESTIONS 1-9: 15

## 2024-08-26 ASSESSMENT — ENCOUNTER SYMPTOMS
RESPIRATORY NEGATIVE: 1
GASTROINTESTINAL NEGATIVE: 1
EYES NEGATIVE: 1

## 2024-08-26 NOTE — PROGRESS NOTES
PROGRESS NOTE    SUBJECTIVE:   Dorothy Bardales is a 74 y.o. female seen for a follow up visit regarding [unfilled]  Headaches that last all day and affect sleep, started last Monday and do not get better with tylenol. Patient also is concerned of heart palpitations that started in April and occur with her anxiety attacks. Patient believes all conditions are linked to stress from hearing bad news from her grandson.      Past Medical History, Past Surgical History, Family history, Social History, and Medications were all reviewed with the patient today and updated as necessary.       Current Outpatient Medications   Medication Sig Dispense Refill    famotidine (PEPCID) 40 MG tablet Take 1 tablet by mouth at bedtime      pantoprazole (PROTONIX) 40 MG tablet Take 1 tablet by mouth daily      busPIRone (BUSPAR) 5 MG tablet Take 1 tablet by mouth 2 times daily 60 tablet 1    tiZANidine (ZANAFLEX) 4 MG tablet Take 1 tablet by mouth 3 times daily as needed (muscle tension/neck pain) 60 tablet 0    DULoxetine (CYMBALTA) 60 MG extended release capsule TAKE 1 CAPSULE EVERY DAY 90 capsule 1    DULoxetine (CYMBALTA) 30 MG extended release capsule TAKE 1 CAPSULE EVERY DAY 90 capsule 1    atorvastatin (LIPITOR) 20 MG tablet TAKE 1 TABLET EVERY DAY 90 tablet 1    traMADol (ULTRAM) 50 MG tablet Take 1 tablet by mouth 2 times daily as needed for Pain for up to 180 days. Max Daily Amount: 100 mg 60 tablet 5    VITAMIN E PO Take by mouth      Omega-3 Fatty Acids (FISH OIL PO) Take by mouth      furosemide (LASIX) 40 MG tablet Take 1 tablet by mouth daily TAKE 1 TABLET EVERY DAY 90 tablet 3    Cholecalciferol (VITAMIN D3) 125 MCG (5000 UT) TABS Take 1 tablet by mouth daily      B Complex Vitamins (VITAMIN B COMPLEX PO) Take 1 tablet by mouth daily.      traMADol (ULTRAM) 50 MG tablet Take 1 tablet by mouth.      lansoprazole (PREVACID) 30 MG delayed release capsule Take 1 capsule by mouth daily TAKE 1 CAPSULE DAILY (BEFORE

## 2024-09-09 DIAGNOSIS — I87.2 EDEMA OF BOTH LOWER EXTREMITIES DUE TO PERIPHERAL VENOUS INSUFFICIENCY: Primary | ICD-10-CM

## 2024-09-11 RX ORDER — FUROSEMIDE 40 MG
40 TABLET ORAL DAILY
Qty: 90 TABLET | Refills: 1 | Status: SHIPPED | OUTPATIENT
Start: 2024-09-11

## 2024-11-01 ENCOUNTER — OFFICE VISIT (OUTPATIENT)
Dept: FAMILY MEDICINE CLINIC | Facility: CLINIC | Age: 74
End: 2024-11-01

## 2024-11-01 VITALS
BODY MASS INDEX: 28.47 KG/M2 | SYSTOLIC BLOOD PRESSURE: 126 MMHG | OXYGEN SATURATION: 97 % | HEART RATE: 77 BPM | HEIGHT: 61 IN | DIASTOLIC BLOOD PRESSURE: 74 MMHG | WEIGHT: 150.8 LBS

## 2024-11-01 DIAGNOSIS — F41.9 ANXIETY: Primary | ICD-10-CM

## 2024-11-01 DIAGNOSIS — Z23 FLU VACCINE NEED: ICD-10-CM

## 2024-11-01 DIAGNOSIS — M62.838 MUSCLE SPASMS OF NECK: ICD-10-CM

## 2024-11-01 DIAGNOSIS — Z12.31 ENCOUNTER FOR SCREENING MAMMOGRAM FOR MALIGNANT NEOPLASM OF BREAST: ICD-10-CM

## 2024-11-01 PROBLEM — F11.20 OPIOID DEPENDENCE WITH CURRENT USE (HCC): Status: RESOLVED | Noted: 2023-09-29 | Resolved: 2024-11-01

## 2024-11-01 PROBLEM — I77.4 MEDIAN ARCUATE LIGAMENT SYNDROME (HCC): Status: RESOLVED | Noted: 2024-01-19 | Resolved: 2024-11-01

## 2024-11-01 RX ORDER — LANSOPRAZOLE 30 MG/1
30 CAPSULE, DELAYED RELEASE ORAL DAILY
Qty: 90 CAPSULE | Refills: 3 | Status: CANCELLED | OUTPATIENT
Start: 2024-11-01

## 2024-11-01 RX ORDER — BUSPIRONE HYDROCHLORIDE 5 MG/1
5 TABLET ORAL 2 TIMES DAILY
COMMUNITY
Start: 2024-10-04 | End: 2024-11-01 | Stop reason: SDUPTHER

## 2024-11-01 RX ORDER — BUSPIRONE HYDROCHLORIDE 5 MG/1
5 TABLET ORAL 2 TIMES DAILY
Qty: 180 TABLET | Refills: 1 | Status: SHIPPED | OUTPATIENT
Start: 2024-11-01

## 2024-11-01 ASSESSMENT — ENCOUNTER SYMPTOMS
ABDOMINAL PAIN: 0
COUGH: 0
CONSTIPATION: 0
NAUSEA: 0
SHORTNESS OF BREATH: 0
DIARRHEA: 0
VOMITING: 0

## 2024-11-01 NOTE — PROGRESS NOTES
daily.       No current facility-administered medications for this visit.     Allergies   Allergen Reactions    Sulfa Antibiotics Nausea And Vomiting and Rash     Patient Active Problem List   Diagnosis    Lumbosacral radiculopathy    COVID-19 vaccination refused    FIDENCIO I (cervical intraepithelial neoplasia I)    Stopped smoking with greater than 30 pack year history    Low grade squamous intraepithelial lesion (LGSIL) on cervical Pap smear    Bilateral carpal tunnel syndrome    Benign essential HTN    Spinal stenosis at L4-L5 level    Mammogram declined    Generalized anxiety disorder with panic attacks    Vitamin D deficiency    Gastroesophageal reflux disease without esophagitis    Hypercholesterolemia    IGT (impaired glucose tolerance)    Hyperkalemia    High risk medication use    Overweight (BMI 25.0-29.9)    Alopecia    Mild episode of recurrent major depressive disorder (HCC)    Cervical stenosis (uterine cervix)    Degenerative arthritis of left knee    Colonoscopy refused    Edema of both lower extremities due to peripheral venous insufficiency    History of esophageal dilatation    Abdominal pain, RUQ    Esophageal dysphagia    Major depressive disorder, recurrent, moderate (HCC)     Past Medical History:   Diagnosis Date    Abnormal Papanicolaou smear of cervix     Arthritis 3/24/2014    Bulging lumbar disc     Depressive disorder 3/24/2014    DVT (deep venous thrombosis) (HCC) 1988    Former smoker     GERD (gastroesophageal reflux disease) 3/24/2014    on med for control    Heartburn     Hiatal hernia     HTN (hypertension) 8/9/2013    HX; no current treatment    Hypercholesterolemia 3/24/2014    on med for control    LGSIL on Pap smear of cervix     HX of LGSIL    Menopause     AGE 43    PUD (peptic ulcer disease)     Spinal stenosis     Spinal stenosis at L4-L5 level 9/29/2017    Followed by PED[facility]    Vitamin D deficiency 3/24/2014     Past Surgical History:   Procedure Laterality Date

## 2024-12-16 ENCOUNTER — OFFICE VISIT (OUTPATIENT)
Dept: ORTHOPEDIC SURGERY | Age: 74
End: 2024-12-16
Payer: MEDICARE

## 2024-12-16 DIAGNOSIS — M54.16 LUMBAR RADICULOPATHY: Primary | ICD-10-CM

## 2024-12-16 DIAGNOSIS — M48.061 SPINAL STENOSIS OF LUMBAR REGION WITHOUT NEUROGENIC CLAUDICATION: ICD-10-CM

## 2024-12-16 PROCEDURE — 1123F ACP DISCUSS/DSCN MKR DOCD: CPT | Performed by: PHYSICAL MEDICINE & REHABILITATION

## 2024-12-16 PROCEDURE — G8428 CUR MEDS NOT DOCUMENT: HCPCS | Performed by: PHYSICAL MEDICINE & REHABILITATION

## 2024-12-16 PROCEDURE — G2211 COMPLEX E/M VISIT ADD ON: HCPCS | Performed by: PHYSICAL MEDICINE & REHABILITATION

## 2024-12-16 PROCEDURE — G8482 FLU IMMUNIZE ORDER/ADMIN: HCPCS | Performed by: PHYSICAL MEDICINE & REHABILITATION

## 2024-12-16 PROCEDURE — 1090F PRES/ABSN URINE INCON ASSESS: CPT | Performed by: PHYSICAL MEDICINE & REHABILITATION

## 2024-12-16 PROCEDURE — G8417 CALC BMI ABV UP PARAM F/U: HCPCS | Performed by: PHYSICAL MEDICINE & REHABILITATION

## 2024-12-16 PROCEDURE — G8399 PT W/DXA RESULTS DOCUMENT: HCPCS | Performed by: PHYSICAL MEDICINE & REHABILITATION

## 2024-12-16 PROCEDURE — 1036F TOBACCO NON-USER: CPT | Performed by: PHYSICAL MEDICINE & REHABILITATION

## 2024-12-16 PROCEDURE — 3017F COLORECTAL CA SCREEN DOC REV: CPT | Performed by: PHYSICAL MEDICINE & REHABILITATION

## 2024-12-16 PROCEDURE — 99214 OFFICE O/P EST MOD 30 MIN: CPT | Performed by: PHYSICAL MEDICINE & REHABILITATION

## 2024-12-16 RX ORDER — TRAMADOL HYDROCHLORIDE 50 MG/1
50 TABLET ORAL 2 TIMES DAILY PRN
Qty: 60 TABLET | Refills: 5 | Status: SHIPPED | OUTPATIENT
Start: 2024-12-16 | End: 2025-06-14

## 2024-12-16 NOTE — PROGRESS NOTES
Date:  12/16/24     HPI:  I am seeing Dorothy Bardales in evalution/folowup. Office visit 30 minutes to include full review of symptomatology, determination of imaging studies and redocumentation to comply with insurance regulations with regards to ongoing injections.  Also correspondence produced to her primary provider with discussion of ongoing injection therapies.  I saw her most recently in June.  She has pain in the lower lumbar paraspinal areas that may gravitate to the buttocks.  No neurologic issues lower extremities, I cannot generate a history of a progressive gait disturbance.  She has noted significant lumbar spinal stenosis from prior imaging in the past.  She takes tramadol twice a day with good tolerability.  It is noted that the anti-inflammatory medications have produced gastrointestinal side effects and on 1 occasion caused elevated blood pressures.  Her track record with a translaminar lumbar LYNDA with trigger point injections is quite good.  She can have 80% pain reduction for up to 6 months, axial last set of injections were 7 months ago she still doing well enough to where she does not feel reinjection is indicated.  Typically when the pain gets to the point she wants reinjection, her pain scale is 8/10.  She has trouble bathing, cleaning, dressing, getting into or out of a car, household activities, exercising.  She does perform provider directed lumbar spine exercises routinely has been doing this for at least several years.    I have evaluated her situation with regards to [place]  aware.  She received tramadol only from me.    Correspondence has been sent the patient's primary provider with the fact that she will require corticosteroid injections intermittently for the foreseeable future.  If there are any concerns over this being a medical detriment the patient, they are requested to let me know.    ROS: No new problems to report.    PE: Alert and cooperative.  Good strength lower

## 2024-12-30 DIAGNOSIS — E78.00 PURE HYPERCHOLESTEROLEMIA, UNSPECIFIED: ICD-10-CM

## 2024-12-30 DIAGNOSIS — F33.1 MAJOR DEPRESSIVE DISORDER, RECURRENT, MODERATE (HCC): ICD-10-CM

## 2025-01-03 RX ORDER — DULOXETIN HYDROCHLORIDE 60 MG/1
60 CAPSULE, DELAYED RELEASE ORAL DAILY
Qty: 90 CAPSULE | Refills: 3 | Status: SHIPPED | OUTPATIENT
Start: 2025-01-03

## 2025-01-03 RX ORDER — ATORVASTATIN CALCIUM 20 MG/1
20 TABLET, FILM COATED ORAL DAILY
Qty: 90 TABLET | Refills: 3 | Status: SHIPPED | OUTPATIENT
Start: 2025-01-03

## 2025-01-03 RX ORDER — DULOXETIN HYDROCHLORIDE 30 MG/1
30 CAPSULE, DELAYED RELEASE ORAL DAILY
Qty: 90 CAPSULE | Refills: 3 | Status: SHIPPED | OUTPATIENT
Start: 2025-01-03

## 2025-01-25 SDOH — ECONOMIC STABILITY: TRANSPORTATION INSECURITY
IN THE PAST 12 MONTHS, HAS LACK OF TRANSPORTATION KEPT YOU FROM MEETINGS, WORK, OR FROM GETTING THINGS NEEDED FOR DAILY LIVING?: NO

## 2025-01-25 SDOH — ECONOMIC STABILITY: FOOD INSECURITY: WITHIN THE PAST 12 MONTHS, YOU WORRIED THAT YOUR FOOD WOULD RUN OUT BEFORE YOU GOT MONEY TO BUY MORE.: NEVER TRUE

## 2025-01-25 SDOH — ECONOMIC STABILITY: INCOME INSECURITY: IN THE LAST 12 MONTHS, WAS THERE A TIME WHEN YOU WERE NOT ABLE TO PAY THE MORTGAGE OR RENT ON TIME?: NO

## 2025-01-25 SDOH — ECONOMIC STABILITY: FOOD INSECURITY: WITHIN THE PAST 12 MONTHS, THE FOOD YOU BOUGHT JUST DIDN'T LAST AND YOU DIDN'T HAVE MONEY TO GET MORE.: NEVER TRUE

## 2025-01-25 SDOH — ECONOMIC STABILITY: TRANSPORTATION INSECURITY
IN THE PAST 12 MONTHS, HAS THE LACK OF TRANSPORTATION KEPT YOU FROM MEDICAL APPOINTMENTS OR FROM GETTING MEDICATIONS?: NO

## 2025-01-25 ASSESSMENT — PATIENT HEALTH QUESTIONNAIRE - PHQ9
6. FEELING BAD ABOUT YOURSELF - OR THAT YOU ARE A FAILURE OR HAVE LET YOURSELF OR YOUR FAMILY DOWN: NOT AT ALL
7. TROUBLE CONCENTRATING ON THINGS, SUCH AS READING THE NEWSPAPER OR WATCHING TELEVISION: NOT AT ALL
SUM OF ALL RESPONSES TO PHQ QUESTIONS 1-9: 9
5. POOR APPETITE OR OVEREATING: NEARLY EVERY DAY
5. POOR APPETITE OR OVEREATING: NEARLY EVERY DAY
8. MOVING OR SPEAKING SO SLOWLY THAT OTHER PEOPLE COULD HAVE NOTICED. OR THE OPPOSITE - BEING SO FIDGETY OR RESTLESS THAT YOU HAVE BEEN MOVING AROUND A LOT MORE THAN USUAL: NOT AT ALL
1. LITTLE INTEREST OR PLEASURE IN DOING THINGS: SEVERAL DAYS
SUM OF ALL RESPONSES TO PHQ9 QUESTIONS 1 & 2: 1
10. IF YOU CHECKED OFF ANY PROBLEMS, HOW DIFFICULT HAVE THESE PROBLEMS MADE IT FOR YOU TO DO YOUR WORK, TAKE CARE OF THINGS AT HOME, OR GET ALONG WITH OTHER PEOPLE: NOT DIFFICULT AT ALL
3. TROUBLE FALLING OR STAYING ASLEEP: NEARLY EVERY DAY
2. FEELING DOWN, DEPRESSED OR HOPELESS: NOT AT ALL
2. FEELING DOWN, DEPRESSED OR HOPELESS: NOT AT ALL
9. THOUGHTS THAT YOU WOULD BE BETTER OFF DEAD, OR OF HURTING YOURSELF: NOT AT ALL
8. MOVING OR SPEAKING SO SLOWLY THAT OTHER PEOPLE COULD HAVE NOTICED. OR THE OPPOSITE, BEING SO FIGETY OR RESTLESS THAT YOU HAVE BEEN MOVING AROUND A LOT MORE THAN USUAL: NOT AT ALL
SUM OF ALL RESPONSES TO PHQ QUESTIONS 1-9: 9
SUM OF ALL RESPONSES TO PHQ QUESTIONS 1-9: 9
6. FEELING BAD ABOUT YOURSELF - OR THAT YOU ARE A FAILURE OR HAVE LET YOURSELF OR YOUR FAMILY DOWN: NOT AT ALL
4. FEELING TIRED OR HAVING LITTLE ENERGY: MORE THAN HALF THE DAYS
10. IF YOU CHECKED OFF ANY PROBLEMS, HOW DIFFICULT HAVE THESE PROBLEMS MADE IT FOR YOU TO DO YOUR WORK, TAKE CARE OF THINGS AT HOME, OR GET ALONG WITH OTHER PEOPLE: NOT DIFFICULT AT ALL
3. TROUBLE FALLING OR STAYING ASLEEP: NEARLY EVERY DAY
7. TROUBLE CONCENTRATING ON THINGS, SUCH AS READING THE NEWSPAPER OR WATCHING TELEVISION: NOT AT ALL
9. THOUGHTS THAT YOU WOULD BE BETTER OFF DEAD, OR OF HURTING YOURSELF: NOT AT ALL
4. FEELING TIRED OR HAVING LITTLE ENERGY: MORE THAN HALF THE DAYS
1. LITTLE INTEREST OR PLEASURE IN DOING THINGS: SEVERAL DAYS

## 2025-01-27 ENCOUNTER — TRANSCRIBE ORDERS (OUTPATIENT)
Dept: SCHEDULING | Age: 75
End: 2025-01-27

## 2025-01-27 DIAGNOSIS — Z12.31 ENCOUNTER FOR SCREENING MAMMOGRAM FOR HIGH-RISK PATIENT: Primary | ICD-10-CM

## 2025-01-28 ENCOUNTER — OFFICE VISIT (OUTPATIENT)
Dept: FAMILY MEDICINE CLINIC | Facility: CLINIC | Age: 75
End: 2025-01-28
Payer: MEDICARE

## 2025-01-28 VITALS
BODY MASS INDEX: 29.57 KG/M2 | SYSTOLIC BLOOD PRESSURE: 126 MMHG | RESPIRATION RATE: 16 BRPM | HEIGHT: 61 IN | WEIGHT: 156.6 LBS | DIASTOLIC BLOOD PRESSURE: 80 MMHG

## 2025-01-28 DIAGNOSIS — M19.90 CHRONIC OSTEOARTHRITIS: Primary | ICD-10-CM

## 2025-01-28 DIAGNOSIS — M48.061 SPINAL STENOSIS AT L4-L5 LEVEL: ICD-10-CM

## 2025-01-28 DIAGNOSIS — J06.9 VIRAL URI: ICD-10-CM

## 2025-01-28 PROCEDURE — 99214 OFFICE O/P EST MOD 30 MIN: CPT | Performed by: FAMILY MEDICINE

## 2025-01-28 PROCEDURE — G8417 CALC BMI ABV UP PARAM F/U: HCPCS | Performed by: FAMILY MEDICINE

## 2025-01-28 PROCEDURE — 1036F TOBACCO NON-USER: CPT | Performed by: FAMILY MEDICINE

## 2025-01-28 PROCEDURE — 1123F ACP DISCUSS/DSCN MKR DOCD: CPT | Performed by: FAMILY MEDICINE

## 2025-01-28 PROCEDURE — 1090F PRES/ABSN URINE INCON ASSESS: CPT | Performed by: FAMILY MEDICINE

## 2025-01-28 PROCEDURE — 3074F SYST BP LT 130 MM HG: CPT | Performed by: FAMILY MEDICINE

## 2025-01-28 PROCEDURE — 3017F COLORECTAL CA SCREEN DOC REV: CPT | Performed by: FAMILY MEDICINE

## 2025-01-28 PROCEDURE — G8399 PT W/DXA RESULTS DOCUMENT: HCPCS | Performed by: FAMILY MEDICINE

## 2025-01-28 PROCEDURE — G8427 DOCREV CUR MEDS BY ELIG CLIN: HCPCS | Performed by: FAMILY MEDICINE

## 2025-01-28 PROCEDURE — 3079F DIAST BP 80-89 MM HG: CPT | Performed by: FAMILY MEDICINE

## 2025-01-28 PROCEDURE — 1126F AMNT PAIN NOTED NONE PRSNT: CPT | Performed by: FAMILY MEDICINE

## 2025-01-28 PROCEDURE — 1159F MED LIST DOCD IN RCRD: CPT | Performed by: FAMILY MEDICINE

## 2025-01-28 ASSESSMENT — ENCOUNTER SYMPTOMS
ABDOMINAL PAIN: 0
DIARRHEA: 0
CONSTIPATION: 0
SHORTNESS OF BREATH: 0
NAUSEA: 0
VOMITING: 0
COUGH: 0

## 2025-01-28 NOTE — PROGRESS NOTES
PROGRESS NOTE    SUBJECTIVE:   Dorothy Bardales is a 74 y.o. female seen for a follow up visit regarding nodule on her first finger.  Patient does have arthritis in her lumbar spine and both of her hips.  She is seeing Dr. Funez and he gives her tramadol.  She also has congestion in her sinuses that makes her nose run a lot.  No sore throat, headache, shortness of breath, fever or chills.  She has had the congestion for about 4 days now.        Past Medical History, Past Surgical History, Family history, Social History, and Medications were all reviewed with the patient today and updated as necessary.       Current Outpatient Medications   Medication Sig Dispense Refill    DULoxetine (CYMBALTA) 60 MG extended release capsule TAKE 1 CAPSULE EVERY DAY 90 capsule 3    DULoxetine (CYMBALTA) 30 MG extended release capsule TAKE 1 CAPSULE EVERY DAY 90 capsule 3    atorvastatin (LIPITOR) 20 MG tablet TAKE 1 TABLET EVERY DAY 90 tablet 3    traMADol (ULTRAM) 50 MG tablet Take 1 tablet by mouth 2 times daily as needed for Pain for up to 180 days. Max Daily Amount: 100 mg 60 tablet 5    tiZANidine (ZANAFLEX) 4 MG tablet Take 1 tablet by mouth 3 times daily as needed (muscle tension/neck pain) 60 tablet 0    busPIRone (BUSPAR) 5 MG tablet Take 1 tablet by mouth 2 times daily 180 tablet 1    furosemide (LASIX) 40 MG tablet Take 1 tablet by mouth daily TAKE 1 TABLET EVERY DAY 90 tablet 1    famotidine (PEPCID) 40 MG tablet Take 1 tablet by mouth at bedtime      pantoprazole (PROTONIX) 40 MG tablet Take 1 tablet by mouth daily      VITAMIN E PO Take by mouth      Omega-3 Fatty Acids (FISH OIL PO) Take by mouth      Cholecalciferol (VITAMIN D3) 125 MCG (5000 UT) TABS Take 1 tablet by mouth daily      B Complex Vitamins (VITAMIN B COMPLEX PO) Take 1 tablet by mouth daily.       No current facility-administered medications for this visit.     Allergies   Allergen Reactions    Sulfa Antibiotics Nausea And Vomiting and Rash

## 2025-02-05 DIAGNOSIS — I87.2 EDEMA OF BOTH LOWER EXTREMITIES DUE TO PERIPHERAL VENOUS INSUFFICIENCY: ICD-10-CM

## 2025-02-05 RX ORDER — FUROSEMIDE 40 MG/1
40 TABLET ORAL DAILY
Qty: 90 TABLET | Refills: 3 | Status: SHIPPED | OUTPATIENT
Start: 2025-02-05

## 2025-02-10 ENCOUNTER — TELEPHONE (OUTPATIENT)
Dept: ORTHOPEDIC SURGERY | Age: 75
End: 2025-02-10

## 2025-02-10 DIAGNOSIS — M54.16 LUMBAR RADICULOPATHY: Primary | ICD-10-CM

## 2025-02-10 NOTE — TELEPHONE ENCOUNTER
Called and scheduled patient for repeat injections with [facility]  2/24  GR  2:15p

## 2025-02-24 ENCOUNTER — OFFICE VISIT (OUTPATIENT)
Dept: ORTHOPEDIC SURGERY | Age: 75
End: 2025-02-24
Payer: MEDICARE

## 2025-02-24 ENCOUNTER — CLINICAL DOCUMENTATION (OUTPATIENT)
Dept: ORTHOPEDIC SURGERY | Age: 75
End: 2025-02-24

## 2025-02-24 DIAGNOSIS — M54.50 LUMBAR BACK PAIN: ICD-10-CM

## 2025-02-24 DIAGNOSIS — M79.10 MYALGIA: ICD-10-CM

## 2025-02-24 DIAGNOSIS — M54.16 LUMBAR RADICULOPATHY: Primary | ICD-10-CM

## 2025-02-24 PROCEDURE — 62323 NJX INTERLAMINAR LMBR/SAC: CPT | Performed by: PHYSICAL MEDICINE & REHABILITATION

## 2025-02-24 PROCEDURE — 20552 NJX 1/MLT TRIGGER POINT 1/2: CPT | Performed by: PHYSICAL MEDICINE & REHABILITATION

## 2025-02-24 RX ORDER — TRIAMCINOLONE ACETONIDE 40 MG/ML
220 INJECTION, SUSPENSION INTRA-ARTICULAR; INTRAMUSCULAR ONCE
Status: COMPLETED | OUTPATIENT
Start: 2025-02-24 | End: 2025-02-24

## 2025-02-24 RX ADMIN — TRIAMCINOLONE ACETONIDE 220 MG: 40 INJECTION, SUSPENSION INTRA-ARTICULAR; INTRAMUSCULAR at 14:36

## 2025-02-24 NOTE — PROGRESS NOTES
Date: 02/24/25   Name: Dorothy Bardales    Pre-Procedural Diagnosis:    Diagnosis Orders   1. Lumbar radiculopathy  XR INJ SPINE THER SUBST LUM/SAC W IMG    INJECT TRIGGER POINT, 1 OR 2    triamcinolone acetonide (KENALOG-40) injection 220 mg    MRI LUMBAR SPINE WO CONTRAST    External Referral To Orthopedic Surgery      2. Myalgia  XR INJ SPINE THER SUBST LUM/SAC W IMG    INJECT TRIGGER POINT, 1 OR 2    triamcinolone acetonide (KENALOG-40) injection 220 mg    MRI LUMBAR SPINE WO CONTRAST    External Referral To Orthopedic Surgery      3. Lumbar back pain  XR INJ SPINE THER SUBST LUM/SAC W IMG    INJECT TRIGGER POINT, 1 OR 2    triamcinolone acetonide (KENALOG-40) injection 220 mg    MRI LUMBAR SPINE WO CONTRAST    External Referral To Orthopedic Surgery          Procedure: Lumbar Epidural Steroid Injection (LYNDA) with Trigger Point Injection(s)    I have reviewed prior lumbar spine radiographs that reveal 5 non rib-bearing lumbar vertebrae. and I have personally reviewed with patient the informed consent for operation/procedure per [facility] protocol.  This involves risks and benefits of procedure, potential for success/improvement of injections,qualifications of physician performing procedure.  Consent form addressed appropriate local anesthesia, emergent blood transfusion, clarification of DNR status.  This form was signed by all appropriate parties and scanned into the medical record. Note that is not appropriate for me to discuss spine surgical issues or other treatment options if I am not the primary clinician.  If I am the ordering clinician, those issues would have been discussed at the appropriate office visit or at upcoming encounter.     Precautions: [facility] Precautions spine injections: None.  Patient denies any prior sensitivity to steroid, local anesthetic, contrast dye, iodine or shellfish.    The procedure was discussed at length with the patient and informed consent was signed. The patient was

## 2025-02-24 NOTE — PROGRESS NOTES
Patient presents today for scheduled translaminar lumbar LYNDA with trigger point injections in the lower lumbar paraspinal areas.  She wants to get a spine surgical consultation to see if any spine surgical options are available for her.  She has not had recent MR imaging lumbar spines I will obtain that study make an appropriate referral at her request.  I will get back in touch with her once we have MRI results.  She was made aware to obtain a disc of the MRI images to facilitate subspecialist referral/consultation.

## 2025-02-25 SDOH — HEALTH STABILITY: PHYSICAL HEALTH: ON AVERAGE, HOW MANY DAYS PER WEEK DO YOU ENGAGE IN MODERATE TO STRENUOUS EXERCISE (LIKE A BRISK WALK)?: 3 DAYS

## 2025-02-25 SDOH — HEALTH STABILITY: PHYSICAL HEALTH: ON AVERAGE, HOW MANY MINUTES DO YOU ENGAGE IN EXERCISE AT THIS LEVEL?: 20 MIN

## 2025-02-25 ASSESSMENT — PATIENT HEALTH QUESTIONNAIRE - PHQ9
6. FEELING BAD ABOUT YOURSELF - OR THAT YOU ARE A FAILURE OR HAVE LET YOURSELF OR YOUR FAMILY DOWN: NOT AT ALL
SUM OF ALL RESPONSES TO PHQ QUESTIONS 1-9: 0
8. MOVING OR SPEAKING SO SLOWLY THAT OTHER PEOPLE COULD HAVE NOTICED. OR THE OPPOSITE, BEING SO FIGETY OR RESTLESS THAT YOU HAVE BEEN MOVING AROUND A LOT MORE THAN USUAL: NOT AT ALL
SUM OF ALL RESPONSES TO PHQ QUESTIONS 1-9: 0
7. TROUBLE CONCENTRATING ON THINGS, SUCH AS READING THE NEWSPAPER OR WATCHING TELEVISION: NOT AT ALL
3. TROUBLE FALLING OR STAYING ASLEEP: NOT AT ALL
10. IF YOU CHECKED OFF ANY PROBLEMS, HOW DIFFICULT HAVE THESE PROBLEMS MADE IT FOR YOU TO DO YOUR WORK, TAKE CARE OF THINGS AT HOME, OR GET ALONG WITH OTHER PEOPLE: NOT DIFFICULT AT ALL
2. FEELING DOWN, DEPRESSED OR HOPELESS: NOT AT ALL
SUM OF ALL RESPONSES TO PHQ QUESTIONS 1-9: 0
SUM OF ALL RESPONSES TO PHQ QUESTIONS 1-9: 0
4. FEELING TIRED OR HAVING LITTLE ENERGY: NOT AT ALL
1. LITTLE INTEREST OR PLEASURE IN DOING THINGS: NOT AT ALL
9. THOUGHTS THAT YOU WOULD BE BETTER OFF DEAD, OR OF HURTING YOURSELF: NOT AT ALL
5. POOR APPETITE OR OVEREATING: NOT AT ALL

## 2025-02-25 ASSESSMENT — LIFESTYLE VARIABLES
HOW MANY STANDARD DRINKS CONTAINING ALCOHOL DO YOU HAVE ON A TYPICAL DAY: 0
HOW OFTEN DO YOU HAVE A DRINK CONTAINING ALCOHOL: NEVER
HOW OFTEN DO YOU HAVE SIX OR MORE DRINKS ON ONE OCCASION: 1
HOW OFTEN DO YOU HAVE A DRINK CONTAINING ALCOHOL: 1
HOW MANY STANDARD DRINKS CONTAINING ALCOHOL DO YOU HAVE ON A TYPICAL DAY: PATIENT DOES NOT DRINK

## 2025-02-26 ENCOUNTER — HOSPITAL ENCOUNTER (OUTPATIENT)
Dept: MAMMOGRAPHY | Age: 75
Discharge: HOME | End: 2025-03-01
Attending: FAMILY MEDICINE
Payer: MEDICARE

## 2025-02-26 ENCOUNTER — LAB (OUTPATIENT)
Dept: FAMILY MEDICINE CLINIC | Facility: CLINIC | Age: 75
End: 2025-02-26

## 2025-02-26 DIAGNOSIS — E78.2 MIXED HYPERLIPIDEMIA: ICD-10-CM

## 2025-02-26 DIAGNOSIS — R73.03 PREDIABETES: ICD-10-CM

## 2025-02-26 DIAGNOSIS — E55.9 VITAMIN D DEFICIENCY: ICD-10-CM

## 2025-02-26 DIAGNOSIS — R73.03 PREDIABETES: Primary | ICD-10-CM

## 2025-02-26 DIAGNOSIS — Z12.31 ENCOUNTER FOR SCREENING MAMMOGRAM FOR MALIGNANT NEOPLASM OF BREAST: ICD-10-CM

## 2025-02-26 LAB
25(OH)D3 SERPL-MCNC: 60.8 NG/ML (ref 30–100)
ALBUMIN SERPL-MCNC: 3.8 G/DL (ref 3.2–4.6)
ALBUMIN/GLOB SERPL: 1.3 (ref 1–1.9)
ALP SERPL-CCNC: 68 U/L (ref 35–104)
ALT SERPL-CCNC: 18 U/L (ref 8–45)
ANION GAP SERPL CALC-SCNC: 14 MMOL/L (ref 7–16)
AST SERPL-CCNC: 21 U/L (ref 15–37)
BILIRUB SERPL-MCNC: 0.2 MG/DL (ref 0–1.2)
BUN SERPL-MCNC: 20 MG/DL (ref 8–23)
CALCIUM SERPL-MCNC: 9.5 MG/DL (ref 8.8–10.2)
CHLORIDE SERPL-SCNC: 100 MMOL/L (ref 98–107)
CHOLEST SERPL-MCNC: 145 MG/DL (ref 0–200)
CO2 SERPL-SCNC: 28 MMOL/L (ref 20–29)
CREAT SERPL-MCNC: 0.71 MG/DL (ref 0.6–1.1)
EST. AVERAGE GLUCOSE BLD GHB EST-MCNC: 132 MG/DL
GLOBULIN SER CALC-MCNC: 3 G/DL (ref 2.3–3.5)
GLUCOSE SERPL-MCNC: 107 MG/DL (ref 70–99)
HBA1C MFR BLD: 6.2 % (ref 0–5.6)
HDLC SERPL-MCNC: 81 MG/DL (ref 40–60)
HDLC SERPL: 1.8 (ref 0–5)
LDLC SERPL CALC-MCNC: 54 MG/DL (ref 0–100)
POTASSIUM SERPL-SCNC: 3.9 MMOL/L (ref 3.5–5.1)
PROT SERPL-MCNC: 6.9 G/DL (ref 6.3–8.2)
SODIUM SERPL-SCNC: 142 MMOL/L (ref 136–145)
TRIGL SERPL-MCNC: 50 MG/DL (ref 0–150)
VLDLC SERPL CALC-MCNC: 10 MG/DL (ref 6–23)

## 2025-02-26 PROCEDURE — 77063 BREAST TOMOSYNTHESIS BI: CPT

## 2025-03-05 ENCOUNTER — TELEPHONE (OUTPATIENT)
Dept: ORTHOPEDIC SURGERY | Age: 75
End: 2025-03-05

## 2025-03-05 NOTE — TELEPHONE ENCOUNTER
Relayed M RI lumbar spine results with patient.  This reveals significant central stenosis at several levels, varying degrees of neuroforaminal narrowing.  I reviewed this study.  She has has requested an appointment to see Dr. Rosario in neurosurgery and we will schedule that now we have MRI results.  She is aware he could take a while to get into see her, fortunately this is not anything emergent.  There is degenerative change on MRI that could be of the spine surgical nature's of such consultation would be appropriate in my opinion.  I will sit tight and see what transpires.  For some reason the patient was not given a copy of her disc despite the fact she requested it after her MRI.  I have called radiology and they will burn a disc today and will be available for the patient.  The next week at her convenience, this at the [facility] office.

## 2025-03-07 ENCOUNTER — OFFICE VISIT (OUTPATIENT)
Dept: FAMILY MEDICINE CLINIC | Facility: CLINIC | Age: 75
End: 2025-03-07

## 2025-03-07 VITALS
BODY MASS INDEX: 28.92 KG/M2 | SYSTOLIC BLOOD PRESSURE: 134 MMHG | OXYGEN SATURATION: 98 % | HEIGHT: 61 IN | HEART RATE: 69 BPM | WEIGHT: 153.2 LBS | DIASTOLIC BLOOD PRESSURE: 86 MMHG

## 2025-03-07 DIAGNOSIS — Z00.00 MEDICARE ANNUAL WELLNESS VISIT, SUBSEQUENT: Primary | ICD-10-CM

## 2025-03-07 DIAGNOSIS — F41.9 ANXIETY: ICD-10-CM

## 2025-03-07 DIAGNOSIS — R73.03 PREDIABETES: ICD-10-CM

## 2025-03-07 DIAGNOSIS — Z87.891 PERSONAL HISTORY OF TOBACCO USE: ICD-10-CM

## 2025-03-07 RX ORDER — BUSPIRONE HYDROCHLORIDE 5 MG/1
5 TABLET ORAL 2 TIMES DAILY
Qty: 180 TABLET | Refills: 1 | Status: SHIPPED | OUTPATIENT
Start: 2025-03-07 | End: 2025-09-03

## 2025-03-07 NOTE — PROGRESS NOTES
Medicare Annual Wellness Visit    Dorothy Bardales is here for Medicare AWV (Discuss lab results) and Sinus Problem (Complains of sinus issues x 1 month; greenish/yellowish bloody nasal congestion)    Assessment & Plan   Medicare annual wellness visit, subsequent  Anxiety  -     busPIRone (BUSPAR) 5 MG tablet; Take 1 tablet by mouth 2 times daily, Disp-180 tablet, R-1Normal  Personal history of tobacco use  -     AL VISIT TO DISCUSS LUNG CA SCREEN W LDCT  Prediabetes       Return in about 6 months (around 9/7/2025) for chronic problem follow up.     Subjective   The following acute and/or chronic problems were also addressed today:  Also went over labs. Pt has prediabetes.   Hemoglobin A1C   Date Value Ref Range Status   02/26/2025 6.2 (H) 0 - 5.6 % Final     Comment:     Reference Range  Normal       <5.7%  Prediabetes  5.7-6.4%  Diabetes     >6.4%       Normal lipid panel, CMP and Vit D.   Anxiety is controlled with Cymbalta and Buspar. Needs refills on Buspar     Patient's complete Health Risk Assessment and screening values have been reviewed and are found in Flowsheets. The following problems were reviewed today and where indicated follow up appointments were made and/or referrals ordered.    Positive Risk Factor Screenings with Interventions:          Controlled Medication Review:    Today's Pain Level: No data recorded   Opioid Risk: (Low risk score <55) Opioid risk score: 31    Patient is low risk for opioid use disorder or overdose.    Last PDMP Andrew as Reviewed:  Review User Review Instant Review Result                      Dentist Screen:  Have you seen the dentist within the past year?: (!) No    Intervention:  Advised to schedule with their dentist        Advanced Directives:  Do you have a Living Will?: (!) No    Intervention:  has NO advanced directive - information provided         Lung Cancer Screening:  Guidelines regarding LDCT screening for lung cancer reviewed. Patient declined

## 2025-03-07 NOTE — PATIENT INSTRUCTIONS
the toothbrush. Their hands and fingers may be stiff, painful, or weak. If this is the case, you can:  Offer an electric toothbrush.  Enlarge the handle of a non-electric toothbrush by wrapping a sponge, an elastic bandage, or adhesive tape around it.  Push the toothbrush handle through a ball made of rubber or soft foam.  Make the handle longer and thicker by taping Popsicle sticks or tongue depressors to it.  You may also be able to buy special toothbrushes, toothpaste dispensers, and floss holders.  Your doctor may recommend a soft-bristle toothbrush if the person you care for bleeds easily. Bleeding can happen because of a health problem or from certain medicines.  A toothpaste for sensitive teeth may help if the person you care for has sensitive teeth.  How do you brush and floss someone's teeth?  If the person you are caring for has a hard time cleaning their teeth on their own, you may need to brush and floss their teeth for them. It may be easiest to have the person sit and face away from you, and to sit or stand behind them. That way you can steady their head against your arm as you reach around to floss and brush their teeth. Choose a place that has good lighting and is comfortable for both of you.  Before you begin, gather your supplies. You will need gloves, floss, a toothbrush, and a container to hold water if you are not near a sink. Wash and dry your hands well and put on gloves. Start by flossing:  Gently work a piece of floss between each of the teeth toward the gums. A plastic flossing tool may make this easier, and they are available at [facility].  Curve the floss around each tooth into a U-shape and gently slide it under the gum line.  Move the floss firmly up and down several times to scrape off the plaque.  After you've finished flossing, throw away the used floss and begin brushing:  Wet the brush and apply toothpaste.  Place the brush at a 45-degree angle where the teeth meet the gums.

## 2025-03-13 ENCOUNTER — RESULTS FOLLOW-UP (OUTPATIENT)
Dept: MAMMOGRAPHY | Age: 75
End: 2025-03-13

## 2025-03-25 ENCOUNTER — TELEPHONE (OUTPATIENT)
Dept: ORTHOPEDIC SURGERY | Age: 75
End: 2025-03-25

## 2025-03-25 NOTE — TELEPHONE ENCOUNTER
She is calling about a referral. She has a fax to send it to since they didn't receive it. This was a voicemail and she didn't leave the fax.

## 2025-04-14 ENCOUNTER — OFFICE VISIT (OUTPATIENT)
Dept: ORTHOPEDIC SURGERY | Age: 75
End: 2025-04-14
Payer: MEDICARE

## 2025-04-14 DIAGNOSIS — M25.551 RIGHT HIP PAIN: Primary | ICD-10-CM

## 2025-04-14 PROCEDURE — G8428 CUR MEDS NOT DOCUMENT: HCPCS | Performed by: ORTHOPAEDIC SURGERY

## 2025-04-14 PROCEDURE — 1123F ACP DISCUSS/DSCN MKR DOCD: CPT | Performed by: ORTHOPAEDIC SURGERY

## 2025-04-14 PROCEDURE — 1036F TOBACCO NON-USER: CPT | Performed by: ORTHOPAEDIC SURGERY

## 2025-04-14 PROCEDURE — 1090F PRES/ABSN URINE INCON ASSESS: CPT | Performed by: ORTHOPAEDIC SURGERY

## 2025-04-14 PROCEDURE — G8399 PT W/DXA RESULTS DOCUMENT: HCPCS | Performed by: ORTHOPAEDIC SURGERY

## 2025-04-14 PROCEDURE — 3017F COLORECTAL CA SCREEN DOC REV: CPT | Performed by: ORTHOPAEDIC SURGERY

## 2025-04-14 PROCEDURE — G8417 CALC BMI ABV UP PARAM F/U: HCPCS | Performed by: ORTHOPAEDIC SURGERY

## 2025-04-14 PROCEDURE — 99214 OFFICE O/P EST MOD 30 MIN: CPT | Performed by: ORTHOPAEDIC SURGERY

## 2025-04-14 NOTE — PROGRESS NOTES
Name: Dorothy Bardales  YOB: 1950  Gender: female  MRN: [id number]    CC:   Chief Complaint   Patient presents with    Hip Pain     Right hip-getting xrays today         HPI:   The right hip pain has been present for a few months and is becoming worse.  It hurts at night when sleeping.  There was not an acute injury to the hip.  The pain is located over the hip.  It hurts to walk and pain worsens with increased distance.  The pain does not radiate down the leg.  Numbness and tingling are not noted.  The patient is now having difficulty putting socks and shoes on.        Treatment so far has been anti-inflammatory medications.  She has done well post knee    Review of Systems  As per HPI.  Pertinent positives and negatives are addressed with the patient, particularly those related to musculoskeletal concerns.  Non-orthopaedic concerns were referred back to the primary care physician.      PMFSH:    Current Outpatient Medications:     busPIRone (BUSPAR) 5 MG tablet, Take 1 tablet by mouth 2 times daily, Disp: 180 tablet, Rfl: 1    furosemide (LASIX) 40 MG tablet, TAKE 1 TABLET EVERY DAY, Disp: 90 tablet, Rfl: 3    DULoxetine (CYMBALTA) 60 MG extended release capsule, TAKE 1 CAPSULE EVERY DAY, Disp: 90 capsule, Rfl: 3    DULoxetine (CYMBALTA) 30 MG extended release capsule, TAKE 1 CAPSULE EVERY DAY, Disp: 90 capsule, Rfl: 3    atorvastatin (LIPITOR) 20 MG tablet, TAKE 1 TABLET EVERY DAY, Disp: 90 tablet, Rfl: 3    traMADol (ULTRAM) 50 MG tablet, Take 1 tablet by mouth 2 times daily as needed for Pain for up to 180 days. Max Daily Amount: 100 mg, Disp: 60 tablet, Rfl: 5    tiZANidine (ZANAFLEX) 4 MG tablet, Take 1 tablet by mouth 3 times daily as needed (muscle tension/neck pain), Disp: 60 tablet, Rfl: 0    famotidine (PEPCID) 40 MG tablet, Take 1 tablet by mouth at bedtime, Disp: , Rfl:     pantoprazole (PROTONIX) 40 MG tablet, Take 1 tablet by mouth daily, Disp: , Rfl:     VITAMIN E PO, Take by

## 2025-04-17 DIAGNOSIS — M25.551 RIGHT HIP PAIN: Primary | ICD-10-CM

## 2025-05-05 ENCOUNTER — PREP FOR PROCEDURE (OUTPATIENT)
Dept: ORTHOPEDIC SURGERY | Age: 75
End: 2025-05-05

## 2025-05-05 ENCOUNTER — TELEPHONE (OUTPATIENT)
Dept: ORTHOPEDIC SURGERY | Age: 75
End: 2025-05-05

## 2025-05-05 DIAGNOSIS — M25.551 RIGHT HIP PAIN: Primary | ICD-10-CM

## 2025-05-05 NOTE — TELEPHONE ENCOUNTER
She has been at the hospital since 10:00 waiting to have labwork done. They \"didn't know what they were doing\". She said you can call her but she is going back home. Her surgery is in two weeks.

## 2025-05-07 DIAGNOSIS — M25.551 RIGHT HIP PAIN: ICD-10-CM

## 2025-05-07 LAB
ALBUMIN SERPL-MCNC: 3.9 G/DL (ref 3.2–4.6)
ALBUMIN/GLOB SERPL: 1.3 (ref 1–1.9)
ALP SERPL-CCNC: 68 U/L (ref 35–104)
ALT SERPL-CCNC: 25 U/L (ref 8–45)
ANION GAP SERPL CALC-SCNC: 14 MMOL/L (ref 7–16)
APTT PPP: 28.6 SEC (ref 23.3–37.4)
AST SERPL-CCNC: 30 U/L (ref 15–37)
BASOPHILS # BLD: 0.03 K/UL (ref 0–0.2)
BASOPHILS NFR BLD: 0.3 % (ref 0–2)
BILIRUB SERPL-MCNC: 0.4 MG/DL (ref 0–1.2)
BUN SERPL-MCNC: 17 MG/DL (ref 8–23)
CALCIUM SERPL-MCNC: 9.9 MG/DL (ref 8.8–10.2)
CHLORIDE SERPL-SCNC: 95 MMOL/L (ref 98–107)
CO2 SERPL-SCNC: 36 MMOL/L (ref 20–29)
CREAT SERPL-MCNC: 0.91 MG/DL (ref 0.6–1.1)
DIFFERENTIAL METHOD BLD: ABNORMAL
EOSINOPHIL # BLD: 0.04 K/UL (ref 0–0.8)
EOSINOPHIL NFR BLD: 0.4 % (ref 0.5–7.8)
ERYTHROCYTE [DISTWIDTH] IN BLOOD BY AUTOMATED COUNT: 14.4 % (ref 11.9–14.6)
GLOBULIN SER CALC-MCNC: 3.1 G/DL (ref 2.3–3.5)
GLUCOSE SERPL-MCNC: 105 MG/DL (ref 70–99)
HCT VFR BLD AUTO: 44.7 % (ref 35.8–46.3)
HGB BLD-MCNC: 15.1 G/DL (ref 11.7–15.4)
IMM GRANULOCYTES # BLD AUTO: 0.05 K/UL (ref 0–0.5)
IMM GRANULOCYTES NFR BLD AUTO: 0.5 % (ref 0–5)
INR PPP: 0.8
LYMPHOCYTES # BLD: 1.71 K/UL (ref 0.5–4.6)
LYMPHOCYTES NFR BLD: 17.8 % (ref 13–44)
MCH RBC QN AUTO: 34 PG (ref 26.1–32.9)
MCHC RBC AUTO-ENTMCNC: 33.8 G/DL (ref 31.4–35)
MCV RBC AUTO: 100.7 FL (ref 82–102)
MONOCYTES # BLD: 0.88 K/UL (ref 0.1–1.3)
MONOCYTES NFR BLD: 9.1 % (ref 4–12)
NEUTS SEG # BLD: 6.92 K/UL (ref 1.7–8.2)
NEUTS SEG NFR BLD: 71.9 % (ref 43–78)
NRBC # BLD: 0 K/UL (ref 0–0.2)
PLATELET # BLD AUTO: 305 K/UL (ref 150–450)
PMV BLD AUTO: 9.9 FL (ref 9.4–12.3)
POTASSIUM SERPL-SCNC: 3.7 MMOL/L (ref 3.5–5.1)
PROT SERPL-MCNC: 6.9 G/DL (ref 6.3–8.2)
PROTHROMBIN TIME: 12.2 SEC (ref 11.3–14.9)
RBC # BLD AUTO: 4.44 M/UL (ref 4.05–5.2)
SODIUM SERPL-SCNC: 144 MMOL/L (ref 136–145)
WBC # BLD AUTO: 9.6 K/UL (ref 4.3–11.1)

## 2025-05-16 ENCOUNTER — OFFICE VISIT (OUTPATIENT)
Dept: ORTHOPEDIC SURGERY | Age: 75
End: 2025-05-16

## 2025-05-16 DIAGNOSIS — M16.11 PRIMARY OSTEOARTHRITIS OF RIGHT HIP: Primary | ICD-10-CM

## 2025-05-16 RX ORDER — METHOCARBAMOL 750 MG/1
750 TABLET, FILM COATED ORAL 4 TIMES DAILY PRN
Qty: 30 TABLET | Refills: 0 | Status: SHIPPED | OUTPATIENT
Start: 2025-05-16 | End: 2025-05-26

## 2025-05-16 RX ORDER — CELECOXIB 100 MG/1
100 CAPSULE ORAL 2 TIMES DAILY
Qty: 60 CAPSULE | Refills: 0 | Status: SHIPPED | OUTPATIENT
Start: 2025-05-16

## 2025-05-16 RX ORDER — ONDANSETRON 4 MG/1
4 TABLET, FILM COATED ORAL EVERY 8 HOURS PRN
Qty: 20 TABLET | Refills: 0 | Status: SHIPPED | OUTPATIENT
Start: 2025-05-16

## 2025-05-16 RX ORDER — ASPIRIN 81 MG/1
81 TABLET ORAL 2 TIMES DAILY
Qty: 70 TABLET | Refills: 0 | Status: SHIPPED | OUTPATIENT
Start: 2025-05-16 | End: 2025-06-20

## 2025-05-16 RX ORDER — OXYCODONE HYDROCHLORIDE 5 MG/1
5-10 TABLET ORAL EVERY 4 HOURS PRN
Qty: 60 TABLET | Refills: 0 | Status: SHIPPED | OUTPATIENT
Start: 2025-05-16 | End: 2025-05-21

## 2025-05-16 NOTE — PROGRESS NOTES
[facility]  History and Physical Exam    Patient ID:  Dorothy Bardales  [id number]    75 y.o.  1950    Today: May 16, 2025    Vitals Signs: Reviewed as noted in medical record.    Allergies:   Allergies   Allergen Reactions    Sulfa Antibiotics Nausea And Vomiting and Rash       CC: right hip pain    HPI:  Pt presents today for preop visit in anticipation of right BRITTNY at the [facility] on 5/22/25.  She complains of progressively worsening right hip pain secondary to right hip DJD and has failed to adequately respond to nonsurgical treatment thus far.  No other new complaints.    Relevant PMH:   Past Medical History:   Diagnosis Date    Abnormal Papanicolaou smear of cervix     Arthritis 3/24/2014    Bulging lumbar disc     Depressive disorder 3/24/2014    DVT (deep venous thrombosis) ([facility]) 1988    Former smoker     GERD (gastroesophageal reflux disease) 3/24/2014    on med for control    Heartburn     Hiatal hernia     HTN (hypertension) 8/9/2013    HX; no current treatment    Hypercholesterolemia 3/24/2014    on med for control    LGSIL on Pap smear of cervix     HX of LGSIL    Menopause     AGE 43    PUD (peptic ulcer disease)     Spinal stenosis     Spinal stenosis at L4-L5 level 9/29/2017    Followed by PEDRO [facility]    Vitamin D deficiency 3/24/2014       Objective:                    HEENT: NC/AT                   Lungs:  clear                   Heart:   rrr                   Abdomen: soft                   Extremities:  Pain with rom of the right hip joint    Radiographs: reveal right hip osteoarthritis with loss of joint space and bone spurs.    Assessment: Right hip osteoarthritis    Plan:  Proceed with scheduled right total hip arthroplasty.  The patient has failed conservative treatment including NSAIDS, and injections.  Total joint replacement surgery and associated risks and benefits have been discussed with the patient along with implant selection including but not limited to

## 2025-05-22 ENCOUNTER — OUTSIDE SERVICES (OUTPATIENT)
Dept: ORTHOPEDIC SURGERY | Age: 75
End: 2025-05-22

## 2025-05-22 DIAGNOSIS — M25.551 RIGHT HIP PAIN: Primary | ICD-10-CM

## 2025-05-22 DIAGNOSIS — M16.11 PRIMARY OSTEOARTHRITIS OF RIGHT HIP: ICD-10-CM

## 2025-05-22 NOTE — OP NOTE
performed. Soft tissue was removed from the acetabulum. The acetabular surface revealed loss of cartilage with exposed bone. The acetabulum was sequentially reamed. Using trial components, the acetabulum was sized to a 52 mm [place] acetabular cup.The acetabular component was impacted to achieve appropriate horizontal tilt, anteversion, coverage, and stability.    1 screw was  used to stabilize the cup. The polyethelene liner was impacted into position.      Utilizing the femoral retractor, the canal was prepared with appropriate lateralization with the starter rasp. The canal was then broached progressively. The broach was positioned with the appropriate anatomic femoral anteversion. A calcar planar was utilized. A trial reduction with a +5 neck length was utilized. This was found to be the most stable to flexion greater than 90 degrees and on internal and external rotation. Limb lengths were thought to be equilibrated and with appropriate stability as mentioned above. All trial components were removed.     The cementless permanent size 4 high offset ACTIS  was impacted into place. A trial reduction was performed and the above neck length was selected. The permanent Biolox femoral head was impacted into place.     Dorothy Bardales's hip was reduced and stability was as mentioned above. Copious irrigation was accomplished about the surgical site. The sciatic nerve was palpated and noted to be intact. The capsule was repaired with a #1 PDS and the external rotators were reattached with a #5 Mersilene.     A lavage betadine was allowed to soak in the wound for 3 minutes after implanting of the prosthesis. The wound was irrigated with Saline again before closure.. Prior to the final skin closure.      The operative hip was injected prior to closure for post operative pain management. A #1 PDS suture was used to re-approximate the fascia. 1 gram of transexamic acid with 1 gram of Vancomycin powder was diluted in

## 2025-05-23 ENCOUNTER — TELEPHONE (OUTPATIENT)
Dept: ORTHOPEDIC SURGERY | Age: 75
End: 2025-05-23

## 2025-05-23 NOTE — TELEPHONE ENCOUNTER
Spoke to therapist- went over D/C instructions and also discussed ASA , she should be taking the 81 mg twice a day.

## 2025-05-23 NOTE — TELEPHONE ENCOUNTER
She needs wound care orders and also wants to know if she is supposed to be on aspirin. It is on the protocol but not the discharge orders. Please return her call.

## 2025-06-02 ENCOUNTER — OFFICE VISIT (OUTPATIENT)
Dept: ORTHOPEDIC SURGERY | Age: 75
End: 2025-06-02

## 2025-06-02 ENCOUNTER — TELEPHONE (OUTPATIENT)
Age: 75
End: 2025-06-02

## 2025-06-02 ENCOUNTER — TELEPHONE (OUTPATIENT)
Dept: ORTHOPEDIC SURGERY | Age: 75
End: 2025-06-02

## 2025-06-02 DIAGNOSIS — M25.551 RIGHT HIP PAIN: Primary | ICD-10-CM

## 2025-06-02 RX ORDER — METHOCARBAMOL 750 MG/1
750 TABLET, FILM COATED ORAL 4 TIMES DAILY PRN
Qty: 20 TABLET | Refills: 0 | Status: SHIPPED | OUTPATIENT
Start: 2025-06-02

## 2025-06-02 RX ORDER — OXYCODONE HYDROCHLORIDE 5 MG/1
5 TABLET ORAL
Qty: 30 TABLET | Refills: 0 | Status: SHIPPED | OUTPATIENT
Start: 2025-06-02 | End: 2025-06-07

## 2025-06-02 NOTE — PROGRESS NOTES
In today 12 days post hip arthroplasty.  Dressing changed last Tuesday and then drained and reinforced again last night here today for follow-up.    On exam the staple line is well-approximated.  There is some sleepiness to over the drain is removed.  No obvious opening here.  I had her stand up and walk around she had no drainage.  The staples were removed and Steri-Strips applied.     AP pelvis lateral of the right hip reveal good bone prosthetic interface there things bowels prep.  Radiographic notes stable right hip arthroplasty with staples.    Impression she has a stable right hip arthroplasty with prior drainage.  I think she is liquefied her hematoma as she has some hematoma and swelling in her leg.    Recommendation should be allowed to weight-bear as tolerated and stay active.  Will plan on seeing her back at her normally scheduled appointment to make sure things are stable moving forward here

## 2025-06-02 NOTE — TELEPHONE ENCOUNTER
Left detailed message for the patient to return phone call and to have whoever answers the phone to call the [facility] if possible

## 2025-06-02 NOTE — TELEPHONE ENCOUNTER
Patient called this morning and stated that she is having some leakage from her bandage from her sx on May 22nd

## 2025-06-19 ENCOUNTER — OFFICE VISIT (OUTPATIENT)
Dept: ORTHOPEDIC SURGERY | Age: 75
End: 2025-06-19
Payer: MEDICARE

## 2025-06-19 DIAGNOSIS — M25.551 PAIN OF RIGHT HIP: ICD-10-CM

## 2025-06-19 DIAGNOSIS — M79.10 MYALGIA, UNSPECIFIED SITE: ICD-10-CM

## 2025-06-19 DIAGNOSIS — M48.061 SPINAL STENOSIS OF LUMBAR REGION WITHOUT NEUROGENIC CLAUDICATION: ICD-10-CM

## 2025-06-19 DIAGNOSIS — M54.50 LUMBAR BACK PAIN: ICD-10-CM

## 2025-06-19 DIAGNOSIS — M54.16 LUMBAR RADICULOPATHY: Primary | ICD-10-CM

## 2025-06-19 PROCEDURE — G8428 CUR MEDS NOT DOCUMENT: HCPCS | Performed by: PHYSICAL MEDICINE & REHABILITATION

## 2025-06-19 PROCEDURE — 1090F PRES/ABSN URINE INCON ASSESS: CPT | Performed by: PHYSICAL MEDICINE & REHABILITATION

## 2025-06-19 PROCEDURE — 99214 OFFICE O/P EST MOD 30 MIN: CPT | Performed by: PHYSICAL MEDICINE & REHABILITATION

## 2025-06-19 PROCEDURE — G2211 COMPLEX E/M VISIT ADD ON: HCPCS | Performed by: PHYSICAL MEDICINE & REHABILITATION

## 2025-06-19 PROCEDURE — 1036F TOBACCO NON-USER: CPT | Performed by: PHYSICAL MEDICINE & REHABILITATION

## 2025-06-19 PROCEDURE — G8399 PT W/DXA RESULTS DOCUMENT: HCPCS | Performed by: PHYSICAL MEDICINE & REHABILITATION

## 2025-06-19 PROCEDURE — G8417 CALC BMI ABV UP PARAM F/U: HCPCS | Performed by: PHYSICAL MEDICINE & REHABILITATION

## 2025-06-19 PROCEDURE — 3017F COLORECTAL CA SCREEN DOC REV: CPT | Performed by: PHYSICAL MEDICINE & REHABILITATION

## 2025-06-19 PROCEDURE — 1123F ACP DISCUSS/DSCN MKR DOCD: CPT | Performed by: PHYSICAL MEDICINE & REHABILITATION

## 2025-06-19 RX ORDER — TRAMADOL HYDROCHLORIDE 50 MG/1
50 TABLET ORAL 2 TIMES DAILY PRN
Qty: 60 TABLET | Refills: 5 | Status: SHIPPED | OUTPATIENT
Start: 2025-06-19 | End: 2025-12-16

## 2025-06-19 NOTE — PROGRESS NOTES
Date:  06/19/25     HPI:  I am seeing Dorothy Bardales in evalution/folowup.  Extended office visit greater 30 minutes to fully address current problems and review of issues in the interim.    I saw her most recently in the office setting in December.  Full details that note but basically with pain in the lower lumbar paraspinal areas that may gravitate to the buttocks.  No neurologic issues.  May have some gait disturbance but nothing that appears to be progressive.  She does have noted significant lumbar spinal stenosis from prior imaging.  She takes tramadol twice a day with good tolerability.  She avoids anti-inflammatory medications because of the GI side effects they have produced.  She does need refills on the tramadol.  I did review her situation with regards to SC  aware she received this medication only from me.  She has received some stronger opiates for that is from a orthopedic procedure.  In the interim she was diagnosed with right hip disease underwent a right BRITTNY a few months back and is doing well with that.  She is still using a cane and is pleased with things.    Last set of injections were in February to include translaminar lumbar LYNDA bilateral lumbar trigger point injection.  She has had very nice response to that.  She typically has 80% pain reduction up to 6 months and again is having significant improvement now.  She does not feel she needs reinjection right now.  When the pain is more noted to require injection, is approximately 8/10 in severity.  This causes trouble bathing, cleaning, dressing, getting into out of a car, household activities, exercising.  She has been performing lumbar spine exercises for several years or more.    In the interim we repeated an MRI lumbar spine reveals persistently significant spinal stenosis at the L2-L3 more so than L3-L4 level.  We reviewed these few months back and she wants to get a spine surgical opinion.  She has an appointment see Dr. Rosario

## 2025-06-20 ASSESSMENT — PROMIS GLOBAL HEALTH SCALE
IN GENERAL, WOULD YOU SAY YOUR QUALITY OF LIFE IS...[ON A SCALE OF 1 (POOR) TO 5 (EXCELLENT)]: VERY GOOD
IN THE PAST 7 DAYS, HOW WOULD YOU RATE YOUR PAIN ON AVERAGE [ON A SCALE FROM 0 (NO PAIN) TO 10 (WORST IMAGINABLE PAIN)]?: 0 NO PAIN
WHO IS THE PERSON COMPLETING THE PROMIS V1.1 SURVEY?: SELF
HOW IS THE PROMIS V1.1 BEING ADMINISTERED?: ELECTRONIC
IN GENERAL, WOULD YOU SAY YOUR HEALTH IS...[ON A SCALE OF 1 (POOR) TO 5 (EXCELLENT)]: VERY GOOD
IN THE PAST 7 DAYS, HOW WOULD YOU RATE YOUR PAIN ON AVERAGE [ON A SCALE FROM 0 (NO PAIN) TO 10 (WORST IMAGINABLE PAIN)]?: 0 NO PAIN
IN GENERAL, HOW WOULD YOU RATE YOUR SATISFACTION WITH YOUR SOCIAL ACTIVITIES AND RELATIONSHIPS [ON A SCALE OF 1 (POOR) TO 5 (EXCELLENT)]?: VERY GOOD
IN GENERAL, PLEASE RATE HOW WELL YOU CARRY OUT YOUR USUAL SOCIAL ACTIVITIES (INCLUDES ACTIVITIES AT HOME, AT WORK, AND IN YOUR COMMUNITY, AND RESPONSIBILITIES AS A PARENT, CHILD, SPOUSE, EMPLOYEE, FRIEND, ETC) [ON A SCALE OF 1 (POOR) TO 5 (EXCELLENT)]?: VERY GOOD
TO WHAT EXTENT ARE YOU ABLE TO CARRY OUT YOUR EVERYDAY PHYSICAL ACTIVITIES SUCH AS WALKING, CLIMBING STAIRS, CARRYING GROCERIES, OR MOVING A CHAIR [ON A SCALE OF 1 (NOT AT ALL) TO 5 (COMPLETELY)]?: MODERATELY
IN GENERAL, HOW WOULD YOU RATE YOUR SATISFACTION WITH YOUR SOCIAL ACTIVITIES AND RELATIONSHIPS [ON A SCALE OF 1 (POOR) TO 5 (EXCELLENT)]?: VERY GOOD
TO WHAT EXTENT ARE YOU ABLE TO CARRY OUT YOUR EVERYDAY PHYSICAL ACTIVITIES SUCH AS WALKING, CLIMBING STAIRS, CARRYING GROCERIES, OR MOVING A CHAIR [ON A SCALE OF 1 (NOT AT ALL) TO 5 (COMPLETELY)]?: MODERATELY
WHO IS THE PERSON COMPLETING THE PROMIS V1.1 SURVEY?: SELF
SUM OF RESPONSES TO QUESTIONS 2, 4, 5, & 10: 15
IN GENERAL, PLEASE RATE HOW WELL YOU CARRY OUT YOUR USUAL SOCIAL ACTIVITIES (INCLUDES ACTIVITIES AT HOME, AT WORK, AND IN YOUR COMMUNITY, AND RESPONSIBILITIES AS A PARENT, CHILD, SPOUSE, EMPLOYEE, FRIEND, ETC) [ON A SCALE OF 1 (POOR) TO 5 (EXCELLENT)]?: VERY GOOD
IN THE PAST 7 DAYS, HOW WOULD YOU RATE YOUR FATIGUE ON AVERAGE [ON A SCALE FROM 1 (NONE) TO 5 (VERY SEVERE)]?: MODERATE
IN THE PAST 7 DAYS, HOW WOULD YOU RATE YOUR FATIGUE ON AVERAGE [ON A SCALE FROM 1 (NONE) TO 5 (VERY SEVERE)]?: MODERATE
IN THE PAST 7 DAYS, HOW OFTEN HAVE YOU BEEN BOTHERED BY EMOTIONAL PROBLEMS, SUCH AS FEELING ANXIOUS, DEPRESSED, OR IRRITABLE [ON A SCALE FROM 1 (NEVER) TO 5 (ALWAYS)]?: SOMETIMES
SUM OF RESPONSES TO QUESTIONS 3, 6, 7, & 8: 10
HOW IS THE PROMIS V1.1 BEING ADMINISTERED?: ELECTRONIC
IN GENERAL, HOW WOULD YOU RATE YOUR PHYSICAL HEALTH [ON A SCALE OF 1 (POOR) TO 5 (EXCELLENT)]?: VERY GOOD
IN THE PAST 7 DAYS, HOW OFTEN HAVE YOU BEEN BOTHERED BY EMOTIONAL PROBLEMS, SUCH AS FEELING ANXIOUS, DEPRESSED, OR IRRITABLE [ON A SCALE FROM 1 (NEVER) TO 5 (ALWAYS)]?: SOMETIMES
IN GENERAL, HOW WOULD YOU RATE YOUR MENTAL HEALTH, INCLUDING YOUR MOOD AND YOUR ABILITY TO THINK [ON A SCALE OF 1 (POOR) TO 5 (EXCELLENT)]?: VERY GOOD

## 2025-06-23 ENCOUNTER — OFFICE VISIT (OUTPATIENT)
Dept: ORTHOPEDIC SURGERY | Age: 75
End: 2025-06-23

## 2025-06-23 DIAGNOSIS — Z96.641 STATUS POST RIGHT HIP REPLACEMENT: Primary | ICD-10-CM

## 2025-06-23 PROCEDURE — 99024 POSTOP FOLLOW-UP VISIT: CPT | Performed by: PHYSICIAN ASSISTANT

## 2025-06-23 NOTE — PROGRESS NOTES
patient returns now 4 weeks post right total hip arthroplasty.  She was seen 12 days postop for postop wound drainage secondary to dislodged hematoma but this has since resolved.  Her right hip wound appears completely healed today.    Radiographs:  AP pelvis and lateral views of the right hip taken in the office on 6/2/2025 and are reviewed today which reveal good bone, prosthetic appearance.  The hip is properly located.    Radiographic Diagnosis:  Normal post-operative right total hip.    Recommendations:    Reviewed x-ray findings with the patient.  Right hip wound appears completely healed.  Patient is to increase their weight bearing status as tolerated.  A cane can be used in transition.  They are to follow the routine dislocation precautions.  They are to wean from their pain medications as tolerated.  I will see them back for their 6 month follow up and radiographs.

## 2025-08-18 DIAGNOSIS — M54.16 LUMBAR RADICULOPATHY: Primary | ICD-10-CM

## 2025-08-20 ENCOUNTER — TELEPHONE (OUTPATIENT)
Dept: ORTHOPEDIC SURGERY | Age: 75
End: 2025-08-20

## 2025-08-29 ENCOUNTER — TELEPHONE (OUTPATIENT)
Dept: ORTHOPEDIC SURGERY | Age: 75
End: 2025-08-29

## 2025-09-03 ENCOUNTER — TELEPHONE (OUTPATIENT)
Dept: ORTHOPEDIC SURGERY | Age: 75
End: 2025-09-03

## (undated) DEVICE — GOWN,REINF,POLY,ECL,PP SLV,XL: Brand: MEDLINE

## (undated) DEVICE — YANKAUER,FLEXIBLE HANDLE,REGLR CAPACITY: Brand: MEDLINE INDUSTRIES, INC.

## (undated) DEVICE — SPONGE LAPAROTOMY W18XL18IN WHITE STRUNG RADIOPAQUE STERILE

## (undated) DEVICE — BIPOLAR SEALER 23-112-1 AQM 6.0: Brand: AQUAMANTYS ®

## (undated) DEVICE — SOLUTION IV 250ML 0.9% SOD CHL PH 5 INJ USP VIAFLX PLAS

## (undated) DEVICE — STERILE PRESSURE PROTECTOR PAD® FOR DE MAYO UNIVERSAL DISTRACTOR® (10/CASE): Brand: DE MAYO UNIVERSAL DISTRACTOR®

## (undated) DEVICE — KIT DRP FOR RIO ROBOTIC ARM ASST SYS

## (undated) DEVICE — STERILE PVP: Brand: MEDLINE INDUSTRIES, INC.

## (undated) DEVICE — SOLUTION IRRIG 1000ML 0.9% SOD CHL USP POUR PLAS BTL

## (undated) DEVICE — TOTAL KNEE DR KAVOLUS: Brand: MEDLINE INDUSTRIES, INC.

## (undated) DEVICE — SUTURE PDS II SZ 1 L96IN ABSRB VLT TP-1 L65MM 1/2 CIR Z880G

## (undated) DEVICE — SOLUTION IRRIG 3000ML 0.9% SOD CHL USP UROMATIC PLAS CONT

## (undated) DEVICE — PIN BNE FIX TEMP L140MM DIA4MM MAKO

## (undated) DEVICE — SUTURE MCRYL SZ 2-0 L27IN ABSRB UD CP-1 1 L36MM 1/2 CIR REV Y266H

## (undated) DEVICE — DRESSING HYDROFIBER AQUACEL AG ADVANTAGE 3.5X12 IN

## (undated) DEVICE — HOOD: Brand: FLYTE

## (undated) DEVICE — KIT TRK KNEE PROC VIZADISC

## (undated) DEVICE — KIT INT FIX FEM TIB CKPT MAKOPLASTY

## (undated) DEVICE — PIN BNE FIX TEMP L110MM DIA4MM MAKO

## (undated) DEVICE — SUTURE VCRL + SZ 1-0 L36IN ABSRB UD CTX 1/2 CIR TAPR PNT VCP977H

## (undated) DEVICE — DRAPE,TOP,102X53,STERILE: Brand: MEDLINE

## (undated) DEVICE — BLADE SURG SAW S STL NAR OSC W/ SERR EDGE DISP